# Patient Record
Sex: FEMALE | Race: WHITE
[De-identification: names, ages, dates, MRNs, and addresses within clinical notes are randomized per-mention and may not be internally consistent; named-entity substitution may affect disease eponyms.]

---

## 2017-08-21 ENCOUNTER — HOSPITAL ENCOUNTER (OUTPATIENT)
Dept: HOSPITAL 75 - RAD | Age: 32
End: 2017-08-21
Attending: NURSE PRACTITIONER
Payer: COMMERCIAL

## 2017-08-21 DIAGNOSIS — R10.2: ICD-10-CM

## 2017-08-21 DIAGNOSIS — N83.202: ICD-10-CM

## 2017-08-21 DIAGNOSIS — N83.201: Primary | ICD-10-CM

## 2017-08-21 PROCEDURE — 76830 TRANSVAGINAL US NON-OB: CPT

## 2017-08-21 NOTE — DIAGNOSTIC IMAGING REPORT
EXAMINATION: Pelvic ultrasound.



INDICATION: Pelvic pain.



COMPARISON: There are no prior studies available for comparison.



FINDINGS: The uterus is nongravid and not enlarged measuring 7.3

x 4.6 x 4.1 cm. The endometrial lining is slightly thickened

measuring 8 mm. This finding is nonspecific. Correlation with the

patient's menstrual cycle would be recommended. There is no focal

mass involving the uterus to suggest a fibroid.



Both ovaries were identified. There is a 1.4 x 1.0 x 1.1 cm cyst

associated with the right ovary. This cyst has a generally benign

appearance. There is also a 1.9 x 1.0 x 1.8 cm thick-walled cyst

arising from the left ovary. This cyst contains a few internal

echoes and may be slightly complicated by infection and/or

hemorrhage.



There is no solid pelvic mass or free fluid collection noted.



IMPRESSION:

1. There are bilateral ovarian cysts. The cyst on the left may be

slightly complicated by infection and/or hemorrhage. If further

study is desired, then a follow-up exam in 4-6 weeks would be

recommended.

2. There is no acute pelvic abnormality noted otherwise.



Dictated by: 



  Dictated on workstation # [id number]

## 2017-08-26 ENCOUNTER — HOSPITAL ENCOUNTER (EMERGENCY)
Dept: HOSPITAL 75 - ER | Age: 32
Discharge: HOME | End: 2017-08-26
Payer: COMMERCIAL

## 2017-08-26 VITALS — DIASTOLIC BLOOD PRESSURE: 59 MMHG | SYSTOLIC BLOOD PRESSURE: 130 MMHG

## 2017-08-26 VITALS — HEIGHT: 61 IN | BODY MASS INDEX: 22.28 KG/M2 | WEIGHT: 118 LBS

## 2017-08-26 DIAGNOSIS — J45.909: ICD-10-CM

## 2017-08-26 DIAGNOSIS — F32.9: ICD-10-CM

## 2017-08-26 DIAGNOSIS — R10.2: ICD-10-CM

## 2017-08-26 DIAGNOSIS — N83.202: Primary | ICD-10-CM

## 2017-08-26 DIAGNOSIS — Z98.51: ICD-10-CM

## 2017-08-26 LAB
ALBUMIN SERPL-MCNC: 4.1 GM/DL (ref 3.2–4.5)
ALT SERPL-CCNC: 17 U/L (ref 0–55)
ANION GAP SERPL CALC-SCNC: 9 MMOL/L (ref 5–14)
AST SERPL-CCNC: 18 U/L (ref 5–34)
BASOPHILS # BLD AUTO: 0.1 10^3/UL (ref 0–0.1)
BASOPHILS NFR BLD AUTO: 1 % (ref 0–10)
BILIRUB SERPL-MCNC: 0.3 MG/DL (ref 0.1–1)
BILIRUB UR QL STRIP: NEGATIVE
BUN SERPL-MCNC: 15 MG/DL (ref 7–18)
BUN/CREAT SERPL: 21
CALCIUM SERPL-MCNC: 8.6 MG/DL (ref 8.5–10.1)
CHLORIDE SERPL-SCNC: 109 MMOL/L (ref 98–107)
CO2 SERPL-SCNC: 21 MMOL/L (ref 21–32)
CREAT SERPL-MCNC: 0.73 MG/DL (ref 0.6–1.3)
EOSINOPHIL # BLD AUTO: 0.3 10^3/UL (ref 0–0.3)
EOSINOPHIL NFR BLD AUTO: 4 % (ref 0–10)
ERYTHROCYTE [DISTWIDTH] IN BLOOD BY AUTOMATED COUNT: 12.9 % (ref 10–14.5)
GFR SERPLBLD BASED ON 1.73 SQ M-ARVRAT: > 60 ML/MIN
GLUCOSE SERPL-MCNC: 92 MG/DL (ref 70–105)
KETONES UR QL STRIP: NEGATIVE
LEUKOCYTE ESTERASE UR QL STRIP: NEGATIVE
LYMPHOCYTES # BLD AUTO: 1.9 X 10^3 (ref 1–4)
LYMPHOCYTES NFR BLD AUTO: 29 % (ref 12–44)
MCH RBC QN AUTO: 29 PG (ref 25–34)
MCHC RBC AUTO-ENTMCNC: 34 G/DL (ref 32–36)
MCV RBC AUTO: 85 FL (ref 80–99)
MONOCYTES # BLD AUTO: 0.3 X 10^3 (ref 0–1)
MONOCYTES NFR BLD AUTO: 5 % (ref 0–12)
NEUTROPHILS # BLD AUTO: 4.1 X 10^3 (ref 1.8–7.8)
NEUTROPHILS NFR BLD AUTO: 61 % (ref 42–75)
NITRITE UR QL STRIP: NEGATIVE
PH UR STRIP: 6 [PH] (ref 5–9)
PLATELET # BLD: 175 10^3/UL (ref 130–400)
PMV BLD AUTO: 11.2 FL (ref 7.4–10.4)
POTASSIUM SERPL-SCNC: 3.9 MMOL/L (ref 3.6–5)
PROT SERPL-MCNC: 6.6 GM/DL (ref 6.4–8.2)
PROT UR QL STRIP: NEGATIVE
RBC # BLD AUTO: 4.38 10^6/UL (ref 4.35–5.85)
SODIUM SERPL-SCNC: 139 MMOL/L (ref 135–145)
SP GR UR STRIP: 1.01 (ref 1.02–1.02)
SQUAMOUS #/AREA URNS HPF: (no result) /HPF
UROBILINOGEN UR-MCNC: NORMAL MG/DL
WBC # BLD AUTO: 6.7 10^3/UL (ref 4.3–11)
WBC #/AREA URNS HPF: (no result) /HPF

## 2017-08-26 PROCEDURE — 85025 COMPLETE CBC W/AUTO DIFF WBC: CPT

## 2017-08-26 PROCEDURE — 36415 COLL VENOUS BLD VENIPUNCTURE: CPT

## 2017-08-26 PROCEDURE — 87591 N.GONORRHOEAE DNA AMP PROB: CPT

## 2017-08-26 PROCEDURE — 76856 US EXAM PELVIC COMPLETE: CPT

## 2017-08-26 PROCEDURE — 81000 URINALYSIS NONAUTO W/SCOPE: CPT

## 2017-08-26 PROCEDURE — 80053 COMPREHEN METABOLIC PANEL: CPT

## 2017-08-26 PROCEDURE — 87210 SMEAR WET MOUNT SALINE/INK: CPT

## 2017-08-26 PROCEDURE — 76830 TRANSVAGINAL US NON-OB: CPT

## 2017-08-26 PROCEDURE — 87491 CHLMYD TRACH DNA AMP PROBE: CPT

## 2017-08-26 PROCEDURE — 84702 CHORIONIC GONADOTROPIN TEST: CPT

## 2017-08-26 RX ADMIN — SODIUM CHLORIDE SCH MLS/HR: 900 INJECTION, SOLUTION INTRAVENOUS at 09:07

## 2017-08-26 RX ADMIN — KETOROLAC TROMETHAMINE ONE MG: 30 INJECTION, SOLUTION INTRAMUSCULAR; INTRAVENOUS at 09:07

## 2017-08-26 NOTE — ED GU-FEMALE
General


Chief Complaint:  Abdominal/GI Problems


Stated Complaint:  L SIDE BACK PAIN AND PELVIC PAIN


Nursing Triage Note:  


ADM TO ROOM REPORTS THAT SHE HAD LOW ABD FULLNESS  APX 2 WEEKS AGO SAW FAMILY 

DR 


WAS PLACED ON MEDS FOR YEAST INFECTION. LAST 2 DAYS ONSET O L FLANK PAIN PMH OF 


OVARIAN CYST.


Nursing Sepsis Screen:  No Definite Risk


Source:  patient, family


Exam Limitations:  no limitations





History of Present Illness


Time seen by provider:  08:55


Initial Comments


This 32-year-old  white female presents with left pelvic pain.  Patient 

was evaluated for a yeast infection and treated with Diflucan approximately a 

week ago.  Patient had on transvaginal ultrasound bilateral cysts greater on 

the left.





The patient's left pelvic pain has increased precipitating her presentation to 

the emergency department today.  The patient has had slight serosanguineous 

vaginal discharge.  The patient's pain is cramping in nature moderate in 

intensity and radiates to the left low back.





Patient has had a tubal ligation.  The patient denies associated fever, chills, 

dysuria, hematuria, nausea vomiting diarrhea or constipation.





Allergies and Home Medications


Allergies


Coded Allergies:  


     No Known Drug Allergies (Unverified , 14)





Home Medications


Docusate Sodium 100 Mg Cap, 100 MG PO BID, #60


   Prescribed by: BRANDO CASTILLO on 14 0904


Hydrocodone Bit/Acetaminophen 1 Tab Tab, 1-2 TAB PO Q4H PRN for pain, #45


   Prescribed by: BRANDO CASTILLO on 14 0904


Magnesium Oxide 400 Mg Tab, 400 MG PO BIDPC, #60


   Prescribed by: BRANDO CASTILLO on 14 0904


Prenatal Vit/Fe Fumarate/Fa 1 Each Tablet, 1 EACH PO DAILY, (Reported)


[Ibuprofen] 600 MG TAB, 600 MG PO Q6H PRN for PAIN, #40


   Prescribed by: BRANDO CASTILLO on 14 0904





Constitutional:  No chills, No fever


EENTM:  No hearing loss, No vision loss


Respiratory:  No cough


Cardiovascular:  No chest pain


Gastrointestinal:  No diarrhea, No vomiting


Genitourinary:  see HPI, discharge (small amount of serosanguineous vaginal 

discharge.), denies dysuria, denies frequency, denies hematuria, other (left 

pelvic pain radiating in the low back.)


Musculoskeletal:  back pain


Skin:  No change in color, No rash


Psychiatric/Neurological:  No Symptoms Reported


Endocrine:  No Symptoms Reported





Past Medical-Social-Family Hx


Patient Social History


Recent Foreign Travel:  No


Contact w/Someone Who Travel:  No


Recent Infectious Disease Expo:  No





Immunizations Up To Date


Tetanus Booster (TDap):  Less than 5yrs


PED Vaccines UTD:  No


Date of Influenza Vaccine:  Oct 1, 2012





Surgeries


History of Surgeries:  Yes (D&C x1)


Surgeries:  Tubal Ligation





Respiratory


History of Respiratory Disorde:  Yes (asthma with seasonal allergies)


Respiratory Disorders:  Asthma





Cardiovascular


History of Cardiac Disorders:  No





Neurological


History of Neurological Disord:  No





Reproductive System


Hx Reproductive Disorders:  No


Sexually Transmitted Disease:  No


HIV/AIDS:  No


Female Reproductive Disorders:  Denies





Gastrointestinal


History of Gastrointestinal Di:  No





Musculoskeletal


History of Musculoskeletal Dis:  Yes


Musculoskeletal Disorders:  Fibromyalgia





Endocrine


History of Endocrine Disorders:  No





Cancer


History of Cancer:  No





Psychosocial


History of Psychiatric Problem:  Yes (pp depression with last pregnancy)


Behavioral Health Disorders:  Depression





Integumentary


History of Skin or Integumenta:  No





Blood Transfusions


History of Blood Disorders:  No


Adverse Reaction to a Blood Tr:  No





Reviewed Nursing Assessment


Reviewed/Agree w Nursing PMH:  Yes





Family Medical History


Significant Family History:  Diabetes, Psychiatric Problems


Family Medial History:  


Family history: Diabetes mellitus


  19 MOTHER, Onset:40's - 50


Family history: Glaucoma


  19 MOTHER, Onset:50's - 60


Psychotic disorder


  19 MOTHER (depression)





Physical Exam


Vital Signs





Vital Sign - Last 12Hours








 17





 08:16


 


Temp 98.1


 


Pulse 69


 


Resp 18


 


B/P (MAP) 130/59


 


Pulse Ox 100


 


O2 Delivery Room Air





Capillary Refill : Less Than 3 Seconds


General Appearance:  WD/WN, no apparent distress


HEENT:  normal ENT inspection


Neck:  normal inspection


Cardiovascular:  regular rate, rhythm


Respiratory:  chest non-tender, lungs clear


Gastrointestinal:  normal bowel sounds, tenderness (greatest in the left lower 

quadrant.  No masses were noted.  There was questionable minimal rebound 

tenderness)


Back:  normal inspection, no CVA tenderness


Extremities:  normal range of motion, non-tender


Neurologic/Psychiatric:  no motor/sensory deficits, alert, normal mood/affect


Skin:  normal color, warm/dry





Progress/Results/Core Measures


Results/Orders


Lab Results





Laboratory Tests








Test


  17


08:59 17


09:10 Range/Units


 


 


White Blood Count


  6.7 


  


  4.3-11.0


10^3/uL


 


Red Blood Count


  4.38 


  


  4.35-5.85


10^6/uL


 


Hemoglobin 12.5   11.5-16.0  G/DL


 


Hematocrit 37   35-52  %


 


Mean Corpuscular Volume 85   80-99  FL


 


Mean Corpuscular Hemoglobin 29   25-34  PG


 


Mean Corpuscular Hemoglobin


Concent 34 


  


  32-36  G/DL


 


 


Red Cell Distribution Width 12.9   10.0-14.5  %


 


Platelet Count


  175 


  


  130-400


10^3/uL


 


Mean Platelet Volume 11.2 H  7.4-10.4  FL


 


Neutrophils (%) (Auto) 61   42-75  %


 


Lymphocytes (%) (Auto) 29   12-44  %


 


Monocytes (%) (Auto) 5   0-12  %


 


Eosinophils (%) (Auto) 4   0-10  %


 


Basophils (%) (Auto) 1   0-10  %


 


Neutrophils # (Auto) 4.1   1.8-7.8  X 10^3


 


Lymphocytes # (Auto) 1.9   1.0-4.0  X 10^3


 


Monocytes # (Auto) 0.3   0.0-1.0  X 10^3


 


Eosinophils # (Auto)


  0.3 


  


  0.0-0.3


10^3/uL


 


Basophils # (Auto)


  0.1 


  


  0.0-0.1


10^3/uL


 


Sodium Level 139   135-145  MMOL/L


 


Potassium Level 3.9   3.6-5.0  MMOL/L


 


Chloride Level 109 H    MMOL/L


 


Carbon Dioxide Level 21   21-32  MMOL/L


 


Anion Gap 9   5-14  MMOL/L


 


Blood Urea Nitrogen 15   7-18  MG/DL


 


Creatinine


  0.73 


  


  0.60-1.30


MG/DL


 


Estimat Glomerular Filtration


Rate > 60 


  


   


 


 


BUN/Creatinine Ratio 21    


 


Glucose Level 92     MG/DL


 


Calcium Level 8.6   8.5-10.1  MG/DL


 


Total Bilirubin 0.3   0.1-1.0  MG/DL


 


Aspartate Amino Transf


(AST/SGOT) 18 


  


  5-34  U/L


 


 


Alanine Aminotransferase


(ALT/SGPT) 17 


  


  0-55  U/L


 


 


Alkaline Phosphatase 54     U/L


 


Total Protein 6.6   6.4-8.2  GM/DL


 


Albumin 4.1   3.2-4.5  GM/DL


 


Human Chorionic Gonadotropin,


Quant < 5 


  


  <5  MIU/ML


 


 


Urine Color  YELLOW   


 


Urine Clarity  CLEAR   


 


Urine pH  6  5-9  


 


Urine Specific Gravity  1.015 L 1.016-1.022  


 


Urine Protein  NEGATIVE  NEGATIVE  


 


Urine Glucose (UA)  NEGATIVE  NEGATIVE  


 


Urine Ketones  NEGATIVE  NEGATIVE  


 


Urine Nitrite  NEGATIVE  NEGATIVE  


 


Urine Bilirubin  NEGATIVE  NEGATIVE  


 


Urine Urobilinogen  NORMAL  NORMAL  MG/DL


 


Urine Leukocyte Esterase  NEGATIVE  NEGATIVE  


 


Urine RBC (Auto)  2+ H NEGATIVE  


 


Urine RBC  2-5 H  /HPF


 


Urine WBC  NONE   /HPF


 


Urine Squamous Epithelial


Cells 


  5-10 


   /HPF


 


 


Urine Crystals  NONE   /LPF


 


Urine Bacteria  NEGATIVE   /HPF


 


Urine Casts  NONE   /LPF


 


Urine Mucus  NEGATIVE   /LPF


 


Urine Culture Indicated  NO   








My Orders





Orders - SAULO CENTENO MD


Hcg,Quantitative (17 08:51)


Cbc With Automated Diff (17 08:51)


Comprehensive Metabolic Panel (17 08:51)


Ua Culture If Indicated (17 08:51)


Us Non Ob Pelvis Comp/Transvag (17 08:51)


Ketorolac Injection (Toradol Injection) (17 09:00)


Ns Iv 1000 Ml (Sodium Chloride 0.9%) (17 09:00)





Medications Given in ED





Current Medications








 Medications  Dose


 Ordered  Sig/Rhonda


 Route  Start Time


 Stop Time Status Last Admin


Dose Admin


 


 Ketorolac


 Tromethamine  30 mg  ONCE  ONCE


 IVP  17 09:00


 17 09:01 DC 17 09:07


30 MG








Vital Signs/I&O





Vital Sign - Last 12Hours








 17





 08:16


 


Temp 98.1


 


Pulse 69


 


Resp 18


 


B/P (MAP) 130/59


 


Pulse Ox 100


 


O2 Delivery Room Air














Blood Pressure Mean:  82








Progress Note :  


   Time:  10:40


Progress Note


The patient's laboratory evaluation and vaginal ultrasound demonstrated 

involuted left ovarian cyst.





Patient's pain was significantly improved with IV Toradol.





Pelvic exam was unremarkable.  There was some moderate left adnexal tenderness 

but no masses were appreciated.





Departure


Impression


Impression:  


 Primary Impression:  


 Pelvic pain


Disposition:   HOME, SELF-CARE


Condition:  Improved





Departure-Patient Inst.


Decision time for Depature:  10:42


Referrals:  


MANDY DWYER MD (PCP/Family)


Primary Care Physician


Patient Instructions:  Ovarian Cyst (DC)





Add. Discharge Instructions:  


Oral Toradol for residual pain as needed.  Close follow-up with your caregiver 

of choice on Monday.  Return if any problems or questions.  All discharge 

instructions reviewed with patient and/or family. Voiced understanding.











SAULO CENTENO MD Aug 26, 2017 09:00

## 2017-08-28 NOTE — XMS REPORT
MU2 Ambulatory Summary

 Created on: 2015



Lorin Lopez

External Reference #: [id number]

: 1985

Sex: Female



Demographics







 Address  [address] [place]  [address]

 

 Home Phone  [phone]

 

 Preferred Language  English

 

 Marital Status  Never 

 

 Druze Affiliation  Unknown

 

 Race  White

 

 Ethnic Group  Not  or 





Author







 Author  [facility]

 

 Organization  [facility]

 

 Address  [address]

[place], [place]  [address]



 

 Phone  [phone]







Care Team Providers







 Care Team Member Name  Role  Phone

 

  PCP  Unavailable







Allergies and Adverse Reactions







 Name  Reaction  Notes

 

   NO KNOWN DRUG ALLERGIES      







Plan of Treatment

Not available.



Medications







 Active 

 

 Name  Start Date  Estimated Completion Date  SIG  Comments

 

 Paxil Oral tablet 10 mg        take 1 tablet (10 mg) by oral route once daily 
  

 

 Prenatal Vitamin Oral tablet 27-0.8 mg        take 1 tablet by oral route once 
daily   

 

 albuterol sulfate Inhalation Solution for Nebulization 2.5 mg/0.5 mL        
inhale 0.5 milliliter (2.5 mg) by nebulization route 3 times per day as needed 
  

 

 amoxicillin oral tablet 500 mg  2015  one TID   







Problem List







 Description  Status  Onset

 

 Asthma  Active   

 

 Depression  Active   

 

 Fatigue  Active  2015

 

 Joint pain  Active  2015

 

 Depressive Disorder  Active  2015







Vital Signs







 Date  Time  BP-Sys(mm[Hg]  BP-Poly(mm[Hg])  HR(bpm)  RR(rpm)  Temp  WT  HT  HC  
BMI  BSA  BMI Percentile  O2 Sat(%)

 

 2015  2:19:00 PM  105 mmHg  60 mmHg  78 bpm  18 rpm  97.5 F  107 lbs  62 
in     19.57 kg/m2  1.46 m2     98 %

 

 3/1/2013  3:12:00 PM  120 mmHg  60 mmHg  98 bpm  18 rpm  97 F  115.25 lbs  63 
in     20.4154 kg/m  1.5244 m     100 %







Social History







 Name  Description  Comments

 

 denies alcohol use      

 

       

 

 College graduate      

 

 Tobacco  Never smoker   

 

 Exercises regularly      

 

 Uses seatbelts      







History of Procedures







 Date Ordered  Description  Order Status

 

 3/1/2013 12:00 AM  THER/PROPH/DIAG INJ SC/IM  Reviewed







Results Summary

Not available.



History Of Immunizations

Not available.



History of Past Illness







 Name  Date of Onset  Comments

 

 Depression      

 

 Asthma      

 

 Fatigue  2015   

 

 Joint pain  2015   

 

 Depressive Disorder  2015   

 

 Cough  Mar  1 2013  3:13PM   

 

 Post-nasal drainage  Mar  1 2013  3:13PM   

 

 Upper Respiratory Infection  Mar  1 2013  3:13PM   

 

 Depressive Disorder  2015  2:19PM   

 

 Fatigue  2015  2:19PM   

 

 Joint pain  2015  2:19PM   







Payers







 Insurance Name  Company Name  Plan Name  Plan Number  Policy Number  Policy 
Group Number  Start Date

 

    [facility]     [id number]     N/A







History of Encounters







 Visit Date  Visit Type  Provider

 

 2015  Office visit  ALL CRUZ

 

 3/1/2013  Office visit  ALL CRUZ

## 2018-08-17 ENCOUNTER — HOSPITAL ENCOUNTER (OUTPATIENT)
Dept: HOSPITAL 75 - RAD | Age: 33
End: 2018-08-17
Attending: FAMILY MEDICINE
Payer: COMMERCIAL

## 2018-08-17 DIAGNOSIS — K21.9: Primary | ICD-10-CM

## 2018-08-17 DIAGNOSIS — R94.5: ICD-10-CM

## 2018-08-17 PROCEDURE — 76705 ECHO EXAM OF ABDOMEN: CPT

## 2018-08-17 NOTE — DIAGNOSTIC IMAGING REPORT
PROCEDURE: US Gallbladder.



TECHNIQUE: Multiple real-time grayscale images were obtained over

the right upper quadrant in various projections.



INDICATION:  Postprandial nausea, elevated liver function

studies.



FINDINGS: Gallbladder is normal. No stone or sludge. The

gallbladder nondilated and wall non-thickened. Hepatic

echotexture pattern appeared normal. There is no liver mass.

There is no intra-or extrahepatic bile duct dilatation evident,

however the majority of the extrahepatic duct as well as the

pancreas obscured by shadowing bowel gas. The unobstructed right

kidney normal in size, cortical thickness and echotexture. There

is no ascites.



IMPRESSION:



Normal right upper quadrant ultrasound.



Dictated by: 



  Dictated on workstation # [id number]

## 2019-01-07 ENCOUNTER — HOSPITAL ENCOUNTER (OUTPATIENT)
Dept: HOSPITAL 75 - PREOP | Age: 34
Discharge: HOME | End: 2019-01-07
Attending: OBSTETRICS & GYNECOLOGY
Payer: COMMERCIAL

## 2019-01-07 VITALS — BODY MASS INDEX: 22.28 KG/M2 | WEIGHT: 118 LBS | HEIGHT: 61 IN

## 2019-01-07 DIAGNOSIS — Z01.818: Primary | ICD-10-CM

## 2019-01-08 ENCOUNTER — HOSPITAL ENCOUNTER (OUTPATIENT)
Dept: HOSPITAL 75 - SDC | Age: 34
Discharge: HOME | End: 2019-01-08
Attending: OBSTETRICS & GYNECOLOGY
Payer: COMMERCIAL

## 2019-01-08 VITALS — WEIGHT: 118 LBS | HEIGHT: 61 IN | BODY MASS INDEX: 22.28 KG/M2

## 2019-01-08 VITALS — SYSTOLIC BLOOD PRESSURE: 115 MMHG | DIASTOLIC BLOOD PRESSURE: 72 MMHG

## 2019-01-08 VITALS — SYSTOLIC BLOOD PRESSURE: 102 MMHG | DIASTOLIC BLOOD PRESSURE: 70 MMHG

## 2019-01-08 VITALS — DIASTOLIC BLOOD PRESSURE: 59 MMHG | SYSTOLIC BLOOD PRESSURE: 110 MMHG

## 2019-01-08 VITALS — DIASTOLIC BLOOD PRESSURE: 73 MMHG | SYSTOLIC BLOOD PRESSURE: 118 MMHG

## 2019-01-08 DIAGNOSIS — J45.909: ICD-10-CM

## 2019-01-08 DIAGNOSIS — N92.0: Primary | ICD-10-CM

## 2019-01-08 DIAGNOSIS — M79.7: ICD-10-CM

## 2019-01-08 PROCEDURE — 84703 CHORIONIC GONADOTROPIN ASSAY: CPT

## 2019-01-08 PROCEDURE — 87081 CULTURE SCREEN ONLY: CPT

## 2019-01-08 PROCEDURE — 94664 DEMO&/EVAL PT USE INHALER: CPT

## 2019-01-08 NOTE — XMS REPORT
Continuity of Care Document

 Created on: 2019



Lorin Lopez

External Reference #: [id number]

: 1985

Sex: Female



Demographics







 Address  [address] [place] [address]  [address]

 

 Home Phone  [phone] x

 

 Preferred Language  Unknown

 

 Marital Status  Unknown

 

 Rastafarian Affiliation  Unknown

 

 Race  Unknown

 

 Ethnic Group  Unknown





Author







 Author  [facility]

 

 Organization  [facility]

 

 Address  Unknown

 

 Phone  Unavailable



              



Allergies

      





 Active            Description            Code            Type            
Severity            Reaction            Onset            Reported/Identified   
         Relationship to Patient            Clinical Status        

 

 Yes            No Known Drug Allergies            [id number]            Drug 
Allergy            Unknown            N/A                         2014   
                               



                  



Medications

      



There is no data.                  



Problems

      





 Date Dx Coded            Attending            Type            Code            
Diagnosis            Diagnosed By        

 

 2013                         Ot            632            MISSED 
                     

 

 2014            CHRISTIAN SEBASTIAN, MEGHA TIMMONS            Ot            
648.93            OT CURR COND-ANTEPARTUM                     

 

 2014            CHRISTIAN SEBASTIAN, MEGHA TIMMONS            Ot            
716.90            ARTHROPATHY NOS-UNSPEC                     

 

 2014            ALL ROLDAN DO            Ot            648.93    
        OT CURR COND-ANTEPARTUM                     

 

 2014            ALL ROLDAN DO            Ot            780.2     
       SYNCOPE AND COLLAPSE                     

 

 2014            LYDIA SEBASTIAN, KESHAV                         V65.11       
     NEW MOMMY VISIT                     

 

 2014            BRANDO CASTILLO DO            Ot            285.1        
    AC POSTHEMORRHAG ANEMIA                     

 

 2014            BRANDO CASTILLO DO            Ot            285.9        
    ANEMIA NOS                     

 

 2014            BRANDO CASTILLO DO            Ot            648.21       
     ANEMIA-DELIVERED                     

 

 2014            BRANDO CASTILLO DO            Ot            654.21       
     PREV  DELIVRY W/ OR W/O MENT ANT                     

 

 2014            BRANDO CASTILLO DO            Ot            V27.0        
    DELIVER-SINGLE LIVEBORN                     

 

 2017            BRANDO CASTILLO DO            Ot            V28.81       
     ENCOUNTER FOR FETAL ANATOMIC SURVEY                     

 

 2017            BRANDO CASTILLO DO            Ot            V28.81       
     ENCOUNTER FOR FETAL ANATOMIC SURVEY                     

 

 2017            ALONDRA EDWARD APRN            Ot            N83.201   
         UNSPECIFIED OVARIAN CYST, RIGHT SIDE                     

 

 2017            ALONDRA EDWARD APRN            Ot            N83.202   
         UNSPECIFIED OVARIAN CYST, LEFT SIDE                     

 

 2017            ALONDRA EDWARD APRN            Ot            R10.2     
       PELVIC AND PERINEAL PAIN                     

 

 2017            JACOBO SEBASTIAN, SAULO MCDANIEL            Ot            F32.9       
     MAJOR DEPRESSIVE DISORDER, SINGLE EPISOD                     

 

 2017            JACOBO SEBASTIAN, SAULO MCDANIEL            Ot            J45.909     
       UNSPECIFIED ASTHMA, UNCOMPLICATED                     

 

 2017            JACOBO SEBASTIAN, SAULO MCDANIEL            Ot            N83.202     
       UNSPECIFIED OVARIAN CYST, LEFT SIDE                     

 

 2017            JACOBO SEBASTIAN, SAULO MCDANIEL            Ot            R10.2       
     PELVIC AND PERINEAL PAIN                     

 

 2017            JACOBO SEBASTIAN, SAULO MCDANIEL            Ot            Z98.51      
      TUBAL LIGATION STATUS                     

 

 2017            JACOBO SEBASTIAN, SAULO MCDANIEL            Ot            F32.9       
     MAJOR DEPRESSIVE DISORDER, SINGLE EPISOD                     

 

 2017            JACOBO SEBASTIAN, SAULO MCDANIEL            Ot            J45.909     
       UNSPECIFIED ASTHMA, UNCOMPLICATED                     

 

 2017            JACOBO SEBASTIAN, SAULO MCDANIEL            Ot            N83.202     
       UNSPECIFIED OVARIAN CYST, LEFT SIDE                     

 

 2017            JACOBO SEBASTIAN, SAULO MCDANIEL            Ot            R10.2       
     PELVIC AND PERINEAL PAIN                     

 

 2017            JACOBO SEBASTIAN, SAULO MCDANIEL            Ot            Z98.51      
      TUBAL LIGATION STATUS                     

 

 2017            JACOBO SEBASTIAN, SAULO MCDANIEL            Ot            F32.9       
     MAJOR DEPRESSIVE DISORDER, SINGLE EPISOD                     

 

 2017            JACOBO SEBASTIAN, SAULO MCDANIEL            Ot            J45.909     
       UNSPECIFIED ASTHMA, UNCOMPLICATED                     

 

 2017            JACOBO SEBASTIAN, SAULO MCDANIEL            Ot            N83.202     
       UNSPECIFIED OVARIAN CYST, LEFT SIDE                     

 

 2017            JACOBO SEBASTIAN, SAULO MCDANIEL            Ot            R10.2       
     PELVIC AND PERINEAL PAIN                     

 

 2017            JACOBO SEBASTIAN, SAULO MCDANIEL            Ot            Z98.51      
      TUBAL LIGATION STATUS                     

 

 2017            ALONDRA EDWARD            Ot            N83.201   
         UNSPECIFIED OVARIAN CYST, RIGHT SIDE                     

 

 2017            ALONDRA EDWARD            Ot            N83.202   
         UNSPECIFIED OVARIAN CYST, LEFT SIDE                     

 

 2017            ALONDRA EDWARD            Ot            R10.2     
       PELVIC AND PERINEAL PAIN                     

 

 2018                         Ot            K21.9            GASTRO-
ESOPHAGEAL REFLUX DISEASE WITHOUT                     

 

 2018                         Ot            R94.5            ABNORMAL 
RESULTS OF LIVER FUNCTION STUDI                     

 

 2018                         Ot            K21.9            GASTRO-
ESOPHAGEAL REFLUX DISEASE WITHOUT                     

 

 2018                         Ot            R94.5            ABNORMAL 
RESULTS OF LIVER FUNCTION STUDI                     

 

 2019            BRANDO CASTILLO DO            Ot            Z01.818      
      ENCOUNTER FOR OTHER PREPROCEDURAL EXAMIN                     



                                                                               
               



Procedures

      





 Code            Description            Performed By            Performed On   
     

 

             74.1                                  LOW CERVICAL       
                             07/15/2014        



                  



Results

      





 Test            Result            Range        









 Vitamin D, 25 OH - 17 10:35         









 Vitamin D, 25 OH            26.50 ng/mL            25..00        









 Lab Card - 17 10:35         









 LabCard            Specimen submitted to [facility] for Testing.        
              









 Hep B Surface Ab - 17 14:40         









 HEP B SURFACE AB, QUAL            REACTIVE                      









 CBC with Auto Diff - 17 11:16         









 Baso%            0.30 %            0.00-2.50        

 

 Eos            0.1 K/uL            0.0-0.7        

 

 Eos%            1.7 %            0.0-7.0        

 

 Hct            39.0 %            36.0-46.0        

 

 Hgb            12.9 g/dL            13.0-15.0        

 

 Lym            1.97 K/uL            0.60-3.40        

 

 Lym%            28.4 %            10.0-50.0        

 

 MCH            28.9 pg            27.0-31.0        

 

 MCHC            33.1 g/dL            32.0-36.0        

 

 MCV            87.4 fL            80.0-97.0        

 

 Mono%            6.5 %            0.0-12.0        

 

 MPV            10.6 fL            7.4-10.0        

 

 Radu%            63.1 %            37.0-80.0        

 

 Plt            223 K/uL            150-400        

 

 RBC            4.46 M/uL            3.60-5.00        

 

 RDW            13.6 %            11.6-14.8        

 

 WBC            6.93 K/uL            5.00-10.00        

 

 Radu            4.37 K/uL            2.00-6.90        

 

 Mono            0.5 K/uL            0.0-0.9        

 

 Baso            0.0 K/uL            0.0-0.2        









 Complete blood count (CBC) with automated white blood cell (WBC) differential 
- 17 08:59         









 Blood leukocytes automated count (number/volume)            6.7 10*3/uL       
     4.3-11.0        

 

 Blood erythrocytes automated count (number/volume)            4.38 10*6/uL    
        4.35-5.85        

 

 Venous blood hemoglobin measurement (mass/volume)            12.5 g/dL        
    11.5-16.0        

 

 Blood hematocrit (volume fraction)            37 %            35-52        

 

 Automated erythrocyte mean corpuscular volume            85 [foz_us]          
  80-99        

 

 Automated erythrocyte mean corpuscular hemoglobin (mass per erythrocyte)      
      29 pg            25-34        

 

 Automated erythrocyte mean corpuscular hemoglobin concentration measurement (
mass/volume)            34 g/dL            32-36        

 

 Automated erythrocyte distribution width ratio            12.9 %            
10.0-14.5        

 

 Automated blood platelet count (count/volume)            175 10*3/uL          
  130-400        

 

 Automated blood platelet mean volume measurement            11.2 [foz_us]     
       7.4-10.4        

 

 Automated blood neutrophils/100 leukocytes            61 %            42-75   
     

 

 Automated blood lymphocytes/100 leukocytes            29 %            12-44   
     

 

 Blood monocytes/100 leukocytes            5 %            0-12        

 

 Automated blood eosinophils/100 leukocytes            4 %            0-10     
   

 

 Automated blood basophils/100 leukocytes            1 %            0-10        

 

 Blood neutrophils automated count (number/volume)            4.1 10*3         
   1.8-7.8        

 

 Blood lymphocytes automated count (number/volume)            1.9 10*3         
   1.0-4.0        

 

 Blood monocytes automated count (number/volume)            0.3 10*3            
0.0-1.0        

 

 Automated eosinophil count            0.3 10*3/uL            0.0-0.3        

 

 Automated blood basophil count (count/volume)            0.1 10*3/uL          
  0.0-0.1        









 Comprehensive metabolic panel - 17 08:59         









 Serum or plasma sodium measurement (moles/volume)            139 mmol/L       
     135-145        

 

 Serum or plasma potassium measurement (moles/volume)            3.9 mmol/L    
        3.6-5.0        

 

 Serum or plasma chloride measurement (moles/volume)            109 mmol/L     
               

 

 Carbon dioxide            21 mmol/L            21-32        

 

 Serum or plasma anion gap determination (moles/volume)            9 mmol/L    
        5-14        

 

 Serum or plasma urea nitrogen measurement (mass/volume)            15 mg/dL   
         7-18        

 

 Serum or plasma creatinine measurement (mass/volume)            0.73 mg/dL    
        0.60-1.30        

 

 Serum or plasma urea nitrogen/creatinine mass ratio            21             
NRG        

 

 Serum or plasma creatinine measurement with calculation of estimated 
glomerular filtration rate            >             NRG        

 

 Serum or plasma glucose measurement (mass/volume)            92 mg/dL         
           

 

 Serum or plasma calcium measurement (mass/volume)            8.6 mg/dL        
    8.5-10.1        

 

 Serum or plasma total bilirubin measurement (mass/volume)            0.3 mg/dL
            0.1-1.0        

 

 Serum or plasma alkaline phosphatase measurement (enzymatic activity/volume)  
          54 U/L                    

 

 Serum or plasma aspartate aminotransferase measurement (enzymatic activity/
volume)            18 U/L            5-34        

 

 Serum or plasma alanine aminotransferase measurement (enzymatic activity/volume
)            17 U/L            0-55        

 

 Serum or plasma protein measurement (mass/volume)            6.6 g/dL         
   6.4-8.2        

 

 Serum or plasma albumin measurement (mass/volume)            4.1 g/dL         
   3.2-4.5        









 Serum or plasma choriogonadotropin measurement (units/volume) - 17 08:59
         









 Serum or plasma choriogonadotropin measurement (units/volume)            < m[iU
]/mL            <5        









 Complete urinalysis with reflex to culture - 17 09:10         









 Urine color determination            YELLOW             NRG        

 

 Urine clarity determination            CLEAR             NRG        

 

 Urine pH measurement by test strip            6             5-9        

 

 Specific gravity of urine by test strip            1.015             1.016-
1.022        

 

 Urine protein assay by test strip, semi-quantitative            NEGATIVE      
       NEGATIVE        

 

 Urine glucose detection by automated test strip            NEGATIVE           
  NEGATIVE        

 

 Erythrocytes detection in urine sediment by light microscopy            2+    
         NEGATIVE        

 

 Urine ketones detection by automated test strip            NEGATIVE           
  NEGATIVE        

 

 Urine nitrite detection by test strip            NEGATIVE             NEGATIVE
        

 

 Urine total bilirubin detection by test strip            NEGATIVE             
NEGATIVE        

 

 Urine urobilinogen measurement by automated test strip (mass/volume)          
  NORMAL             NORMAL        

 

 Urine leukocyte esterase detection by dipstick            NEGATIVE             
NEGATIVE        

 

 Automated urine sediment erythrocyte count by microscopy (number/high power 
field)             [HPF]            NRG        

 

 Automated urine sediment leukocyte count by microscopy (number/high power field
)            NONE             NRG        

 

 Bacteria detection in urine sediment by light microscopy            NEGATIVE  
           NRG        

 

 Squamous epithelial cells detection in urine sediment by light microscopy     
       5-10             NRG        

 

 Crystals detection in urine sediment by light microscopy            NONE      
       NRG        

 

 Casts detection in urine sediment by light microscopy            NONE         
    NRG        

 

 Mucus detection in urine sediment by light microscopy            NEGATIVE     
        NRG        

 

 Complete urinalysis with reflex to culture            NO             NRG      
  









 Microscopic examination by wet preparation - 17 10:38         









 WET PREP RESULTS            NO TRICHOMONAS OBSERVED             NRG        









 Chlamydia trachomatis DNA detection by probe and signal amplification method - 
17 10:38         









 Chlamydia trachomatis DNA detection by probe and target amplification method  
          Not Detected             Not Detected        









 Neisseria gonorrhoeae DNA detection by probe and signal amplification method - 
17 10:38         









 Gonorrhea amp DNA-urine            Not Detected             Not Detected      
  









 Lab Card - 18 08:29         









 LabCard            Specimen submitted to [facility] for Testing.        
              









 Lab Card - 18 09:53         









 LabCard            Specimen submitted to [facility] for Testing.        
              



                                        



Encounters

      





 ACCT No.            Visit Date/Time            Discharge            Status    
        Pt. Type            Provider            Facility            Loc./Unit  
          Complaint        

 

 [id number]            08/10/2015 21:51:47            08/10/2015 23:59:59          
  [facility]            Outpatient            [facility]                       
                        

 

 [id number]            2019 05:33:00            2019 23:59:59    
        CLS            Outpatient            BRANDO CASTILLO DO            [facility] 
[facility]            PREOP            MENORRHAGIA        

 

 [id number]            2017 08:08:00            2017 11:04:00    
        DIS            Emergency            JACOBO SEBASTIAN, SAULO MCDANIEL            [facility] 
[facility]            ER            L SIDE BACK PAIN AND 
PELVIC PAIN        

 

 [id number]            2017 13:57:00            2017 23:59:59    
        CLS            Outpatient            ALONDRA EDWARD            
[facility]            RAD            PELVIC AND PERINEAL 
PAIN        

 

 [id number]            07/15/2014 00:01:00            2014 17:50:00    
        DIS            Inpatient            BRANDO CASTILLO DO            [facility] 
[facility]            LDRP            PT [facility] [place]     
   

 

 [id number]            2014 17:50:00            2014 20:25:00    
        DIS            Outpatient            ALL ROLDAN DO            
[facility]            WSo            SYNCOPE        

 

 [id number]            2014 09:21:00            2014 11:35:00    
        DIS            Outpatient            MEGHA GONZALES MD         
   [facility] [address]            JOINT PAIN        

 

 [id number]            2014 09:45:00            2014 23:59:59    
        CLS            Outpatient            BRANDO CASTILLO DO            [facility] 
[facility]            RAD            FETAL SURVEY        

 

 [id number]            2019 08:00:00                         PEN       
     Preadmit            BRANDO CASTILLO DO            [facility]            SDC            MENORRHAGIA        

 

 [id number]            2018 06:58:00                                   
   Document Registration                                                       
     

 

 [id number]            2013 09:48:00                                   
   Document Registration                                                       
     

 

 [id number]            2018 09:43:00            2018 23:59:00          
  DIS            Outpatient            MANDY DWYER                         
                      

 

 [id number]            2018 08:11:00            2018 23:59:00          
  DIS            Outpatient            MARGARITAEMERALD MANDY                         
                      

 

 [id number]            10/03/2017 16:24:00            10/03/2017 23:59:00          
  DIS            Outpatient            SELF, PHY                               
                

 

 [id number]            2017 11:13:00            2017 23:59:00          
  DIS            Outpatient            REYMUNDO MANDY                         
                      

 

 [id number]            2017 10:30:00            2017 23:59:00          
  DIS            Outpatient            MATY DICKERSON                          
                     

 

 [id number]            10/17/2016 15:40:00            10/17/2016 23:59:00          
  DIS            Outpatient            SELF, GILLIAN                               
                

 

 [id number]            2017 14:36:00                                      
Document Registration                                                          
  

 

 [id number]            2014 14:38:00            2014 23:59:59          
  [facility]            Outpatient            LYDIA SEBASTIAN, KESHAV

## 2019-01-08 NOTE — PROGRESS NOTE-PRE OPERATIVE
Pre-Operative Progress Note


H&P Reviewed


The H&P was reviewed, patient examined and no changes noted.


Date Seen by Provider:  Jan 8, 2019


Time Seen by Provider:  10:30


Date H&P Reviewed:  Jan 8, 2019


Time H&P Reviewed:  07:30


Pre-Operative Diagnosis:  menorrhagia











BRANDO CASTILLO DO Jan 8, 2019 10:32

## 2019-01-08 NOTE — OPERATIVE REPORT
Operative Report


Date of Procedure/Surgery


Jan 8, 2019


Surgeon (s)


BRANDO CASTILLO DO


Assistant (s):  NA





Post-Operative Diagnosis





menorrhagia





Procedure Performed





hysteroscopy dilation and curettage, Novasure endometrial ablation





Description of Procedure


Anesthesia Type:  General


Estimated blood loss (mL):  minimal


Specimen(s) collected/removed


endometrial curettings


Description of the Procedure


With informed consent the patient was taken to the operating room where general 

anesthesia was found to be adequate.  She was prepped and draped in the usual 

sterile fashion in the dorsolithotomy position.  The bladder was drained of 

clear yellow urine with a straight cath.  A weighted speculum was placed in the 

vagina and the cervix was grasped with a tenaculum.  The cervix was dilated 

with Giuseppe dilators. I then inserted  a sure Sound.  The length was 6.5 cm


At this point The hysteroscope was inserted and a hysteroscope was done.  There 

was no abnormal pathology.  


I removed the scope and a gentle curette was done with a small amount of 

proliferative tissue that was sent for pathology.





I then inserted the NovaSure device and measurements were taken.  The width was 

3.8 cm.  I then did the compliance test by inserting a small puff of CO2 and 

once this was passed, the device was enabled.  Ablation was achieved at 60 

seconds with a power of 136.  The device halted the procedure once the ablation 

was completed.  The patient tolerated the procedure well.  The device and 

instruments were removed.  the patient was awakened and taken to recovery in a 

stable condition.  Sponge, lap and instrument counts were correct times two.


Findings of the Procedure


Length 6.5 cm


width 3.8 cm


Poser 136


60 seconds


40 ml deficit





Allergies and Home Medications


Allergies


Coded Allergies:  


     No Known Drug Allergies (Unverified , 1/7/19)





Home Medications


Fluoxetine HCl 40 Mg Capsule, 40 MG PO DAILY, (Reported)


Multivitamin 1 Each Tablet, 1 EACH PO DAILY, (Reported)


Norgestrel-Ethinyl Estradiol 1 Each Tablet, 1 EACH PO DAILY, (Reported)


Ranitidine HCl 75 Mg Tablet, 75 MG PO DAILY, (Reported)





Patient Home Medication List


Home Medication List Reviewed:  Yes











BRANDO CASTILLO DO Jan 8, 2019 11:13

## 2019-01-08 NOTE — DISCHARGE INST-WOMEN'S SERVICE
Discharge Inst-Women's Serv


Depart Medication/Instructions


New, Converted or Re-Newed RX:  RX on Chart


Instructions


expect light spotting, bleeding for up to 7 days


vaginal discharge at about 7-10 days post op is normal


call for fever, pain, concerns


Final Diagnosis


menorrhagia





Consults/Follow Up


Additional Follow Up:  Yes (2 weeks with Carolee or Brock)





Activity


Activity:  Activity as Tolerated


Driving Instructions:  No Driving for 24 Hours


NO SMOKING:  NO SMOKING


Nothing Inside Vagina:  No Douching, No [place] (for 2 weeks), No Tampons





Diet


Discharge Diet:  No Restrictions


Symptoms to Report to DrAndre:  Bleeding Excessive, Pain Increased, Fever Over 101 

Degrees F, Vaginal Bleeding Increase, Cramps in Feet or Legs, Vaginal Discharge 

Foul


For Any Problems or Questions:  Contact Your Physician











BRANDO CASTILLO DO Jan 8, 2019 11:19

## 2021-10-21 ENCOUNTER — HOSPITAL ENCOUNTER (OUTPATIENT)
Dept: HOSPITAL 75 - RAD | Age: 36
End: 2021-10-21
Attending: OBSTETRICS & GYNECOLOGY
Payer: COMMERCIAL

## 2021-10-21 DIAGNOSIS — R10.2: Primary | ICD-10-CM

## 2021-10-21 PROCEDURE — 76830 TRANSVAGINAL US NON-OB: CPT

## 2021-10-21 PROCEDURE — 76856 US EXAM PELVIC COMPLETE: CPT

## 2021-10-21 NOTE — DIAGNOSTIC IMAGING REPORT
PROCEDURE: 

Pelvic comp/transvaginal sonogram.



TECHNIQUE: 

Complete transabdominal and transvaginal pelvic ultrasound was

performed. In addition, limited pelvic Doppler was performed.



INDICATION:  Dyspareunia and pelvic pain.



Uterus measures 6.4 x 3.1 x 4.1 cm. Endometrium is 4 mm in

thickness and shows some heterogeneity. There is heterogeneity of

the myometrium but no discrete myometrial mass is detected. Right

ovary measures 1.5 x 2.1 x 2.3 cm and left ovary measures 2.1 x

1.4 x 1.4 cm. Both ovaries demonstrate blood flow. There is no

free fluid.



IMPRESSION: There is endometrial and myometrial heterogeneity. No

discrete mass is detected. The study is otherwise unremarkable.







Dictated by: 



  Dictated on workstation # [id number]

## 2021-11-02 ENCOUNTER — HOSPITAL ENCOUNTER (OUTPATIENT)
Dept: HOSPITAL 75 - PREOP | Age: 36
LOS: 1 days | Discharge: HOME | End: 2021-11-03
Attending: OBSTETRICS & GYNECOLOGY
Payer: COMMERCIAL

## 2021-11-02 VITALS — BODY MASS INDEX: 20.39 KG/M2 | HEIGHT: 62.01 IN | WEIGHT: 112.22 LBS

## 2021-11-02 DIAGNOSIS — Z01.818: Primary | ICD-10-CM

## 2021-11-09 ENCOUNTER — HOSPITAL ENCOUNTER (OUTPATIENT)
Dept: HOSPITAL 75 - SDC | Age: 36
Discharge: HOME | End: 2021-11-09
Attending: OBSTETRICS & GYNECOLOGY
Payer: COMMERCIAL

## 2021-11-09 VITALS — DIASTOLIC BLOOD PRESSURE: 56 MMHG | SYSTOLIC BLOOD PRESSURE: 82 MMHG

## 2021-11-09 VITALS — SYSTOLIC BLOOD PRESSURE: 90 MMHG | DIASTOLIC BLOOD PRESSURE: 52 MMHG

## 2021-11-09 VITALS — WEIGHT: 112.22 LBS | HEIGHT: 62.01 IN | BODY MASS INDEX: 20.39 KG/M2

## 2021-11-09 VITALS — DIASTOLIC BLOOD PRESSURE: 55 MMHG | SYSTOLIC BLOOD PRESSURE: 88 MMHG

## 2021-11-09 VITALS — DIASTOLIC BLOOD PRESSURE: 40 MMHG | SYSTOLIC BLOOD PRESSURE: 86 MMHG

## 2021-11-09 VITALS — SYSTOLIC BLOOD PRESSURE: 85 MMHG | DIASTOLIC BLOOD PRESSURE: 50 MMHG

## 2021-11-09 VITALS — DIASTOLIC BLOOD PRESSURE: 51 MMHG | SYSTOLIC BLOOD PRESSURE: 89 MMHG

## 2021-11-09 VITALS — SYSTOLIC BLOOD PRESSURE: 91 MMHG | DIASTOLIC BLOOD PRESSURE: 52 MMHG

## 2021-11-09 VITALS — SYSTOLIC BLOOD PRESSURE: 87 MMHG | DIASTOLIC BLOOD PRESSURE: 50 MMHG

## 2021-11-09 VITALS — SYSTOLIC BLOOD PRESSURE: 89 MMHG | DIASTOLIC BLOOD PRESSURE: 53 MMHG

## 2021-11-09 VITALS — DIASTOLIC BLOOD PRESSURE: 67 MMHG | SYSTOLIC BLOOD PRESSURE: 96 MMHG

## 2021-11-09 VITALS — DIASTOLIC BLOOD PRESSURE: 58 MMHG | SYSTOLIC BLOOD PRESSURE: 88 MMHG

## 2021-11-09 DIAGNOSIS — N83.8: ICD-10-CM

## 2021-11-09 DIAGNOSIS — N80.0: ICD-10-CM

## 2021-11-09 DIAGNOSIS — N93.9: Primary | ICD-10-CM

## 2021-11-09 DIAGNOSIS — N81.4: ICD-10-CM

## 2021-11-09 DIAGNOSIS — N99.85: ICD-10-CM

## 2021-11-09 DIAGNOSIS — Z83.3: ICD-10-CM

## 2021-11-09 DIAGNOSIS — D25.2: ICD-10-CM

## 2021-11-09 DIAGNOSIS — Z90.89: ICD-10-CM

## 2021-11-09 DIAGNOSIS — J45.909: ICD-10-CM

## 2021-11-09 DIAGNOSIS — K66.0: ICD-10-CM

## 2021-11-09 DIAGNOSIS — N88.2: ICD-10-CM

## 2021-11-09 DIAGNOSIS — Z79.899: ICD-10-CM

## 2021-11-09 DIAGNOSIS — F32.A: ICD-10-CM

## 2021-11-09 DIAGNOSIS — Z79.891: ICD-10-CM

## 2021-11-09 LAB
APTT PPP: YELLOW S
BACTERIA #/AREA URNS HPF: (no result) /HPF
BASOPHILS # BLD AUTO: 0.1 10^3/UL (ref 0–0.1)
BASOPHILS NFR BLD AUTO: 1 % (ref 0–10)
BILIRUB UR QL STRIP: NEGATIVE
EOSINOPHIL # BLD AUTO: 0.1 10^3/UL (ref 0–0.3)
EOSINOPHIL NFR BLD AUTO: 1 % (ref 0–10)
FIBRINOGEN PPP-MCNC: CLEAR MG/DL
GLUCOSE UR STRIP-MCNC: NEGATIVE MG/DL
HCT VFR BLD CALC: 39 % (ref 35–52)
HGB BLD-MCNC: 13.3 G/DL (ref 11.5–16)
KETONES UR QL STRIP: (no result)
LEUKOCYTE ESTERASE UR QL STRIP: NEGATIVE
LYMPHOCYTES # BLD AUTO: 1.7 10^3/UL (ref 1–4)
LYMPHOCYTES NFR BLD AUTO: 17 % (ref 12–44)
MANUAL DIFFERENTIAL PERFORMED BLD QL: NO
MCH RBC QN AUTO: 29 PG (ref 25–34)
MCHC RBC AUTO-ENTMCNC: 34 G/DL (ref 32–36)
MCV RBC AUTO: 85 FL (ref 80–99)
MONOCYTES # BLD AUTO: 0.4 10^3/UL (ref 0–1)
MONOCYTES NFR BLD AUTO: 4 % (ref 0–12)
NEUTROPHILS # BLD AUTO: 7.3 10^3/UL (ref 1.8–7.8)
NEUTROPHILS NFR BLD AUTO: 76 % (ref 42–75)
NITRITE UR QL STRIP: NEGATIVE
PH UR STRIP: 6 [PH] (ref 5–9)
PLATELET # BLD: 214 10^3/UL (ref 130–400)
PMV BLD AUTO: 10 FL (ref 9–12.2)
PROT UR QL STRIP: NEGATIVE
RBC #/AREA URNS HPF: (no result) /HPF
SP GR UR STRIP: >=1.03 (ref 1.02–1.02)
SQUAMOUS #/AREA URNS HPF: (no result) /HPF
WBC # BLD AUTO: 9.5 10^3/UL (ref 4.3–11)
WBC #/AREA URNS HPF: (no result) /HPF

## 2021-11-09 PROCEDURE — 84703 CHORIONIC GONADOTROPIN ASSAY: CPT

## 2021-11-09 PROCEDURE — 81000 URINALYSIS NONAUTO W/SCOPE: CPT

## 2021-11-09 PROCEDURE — 88307 TISSUE EXAM BY PATHOLOGIST: CPT

## 2021-11-09 PROCEDURE — 86900 BLOOD TYPING SEROLOGIC ABO: CPT

## 2021-11-09 PROCEDURE — 85025 COMPLETE CBC W/AUTO DIFF WBC: CPT

## 2021-11-09 PROCEDURE — 86901 BLOOD TYPING SEROLOGIC RH(D): CPT

## 2021-11-09 PROCEDURE — 36415 COLL VENOUS BLD VENIPUNCTURE: CPT

## 2021-11-09 PROCEDURE — 86850 RBC ANTIBODY SCREEN: CPT

## 2021-11-09 PROCEDURE — 87081 CULTURE SCREEN ONLY: CPT

## 2021-11-09 RX ADMIN — SODIUM CHLORIDE, SODIUM LACTATE, POTASSIUM CHLORIDE, AND CALCIUM CHLORIDE PRN MLS/HR: 600; 310; 30; 20 INJECTION, SOLUTION INTRAVENOUS at 13:14

## 2021-11-09 RX ADMIN — KETOROLAC TROMETHAMINE SCH MG: 30 INJECTION, SOLUTION INTRAMUSCULAR; INTRAVENOUS at 21:00

## 2021-11-09 RX ADMIN — SODIUM CHLORIDE, SODIUM LACTATE, POTASSIUM CHLORIDE, AND CALCIUM CHLORIDE PRN MLS/HR: 600; 310; 30; 20 INJECTION, SOLUTION INTRAVENOUS at 11:50

## 2021-11-09 RX ADMIN — KETOROLAC TROMETHAMINE SCH MG: 30 INJECTION, SOLUTION INTRAMUSCULAR; INTRAVENOUS at 15:20

## 2021-11-09 NOTE — OPERATIVE REPORT
Operative Report


Date of Procedure/Surgery


Nov 9, 2021


Surgeon (s)


BRANDO CASTILLO DO


Assistant (s):  NA





Post-Operative Diagnosis





abnormal uterine bleeding


post ablation


cervical stenosis


extensive enteropelvic and paratubal adhesions


uterovaginal prolapse





Procedure Performed





RaTH, laurel salpingectomy


extensive lysis of adhesions





Description of Procedure


Anesthesia Type:  General


Estimated blood loss (mL):  minimal


Specimen(s) collected/removed


uterus, bilateral tubes and ovaries


Description of the Procedure


After informed consent was obtained, patient was taken into the operating room 

where general anesthetic was found to be adequate.  She was prepped and draped 

in the usual sterile fashion in the dorsal lithotomy position.  





A Rocha catheter was placed.  A speculum was placed in the vagina.   The cervix 

was visualized and the anterior lip was grasped with a sharp toothed tenaculum. 

The uterus was sounded and depth was approximately 8 centimeters. I placed the 

Yanelis device (8 cm) and a 3 cm collar was advanced over the cervix.  I inserted 

the Yanelis  without difficulty, inflating the balloon and securing it around the 

fornix of the cervix.  The collar was then secured with sutures at 12 o'clock.  





Attention was then turned to the patient's abdomen.  The skin was injected with 

0.25% Marcaine.  A supraumbilical incision was made about 8 mm in length.  A 

Veress needle was inserted and I  confirmed intraabdominal placement with a drop

in pressure and the saline drop test.  The opening pressure was 6 mmHg.  I then 

insufflated the abdomen to a maximum of 15 mmHg with warmed CO2 gas.   I placed 

an additional 8 mm trocar in the left abdomen lateral to the umbilicus 

approximately 15 cm lateral to the supraumbilical incision.   





The second  robotic port was placed about 12 cm lateral to the right of the 

umbilical placement.  This was an 8 mm trocar.  These were placed under direct 

visualization of the laparoscope.  0.25% Marcaine was injected prior to 

placement of all trocars.   When all placements were confirmed, the patient was 

placed in steep Trendelenburg allowing adequate visualization and the robot was 

brought in for docking.  The docking was accomplished without difficulty.





 I then took over the command of the robot utilizing the synchroseal and 

monopolar nazia.  The uterus was boggy and anteverted and there were adhesions 

in the right mid abdomen omental and bowel, left pericolic adhesion, adhesions 

in the culdesac, paratubal adhesions that all were taken down prior to 

accomplishing the hysterectomy. 





The right midabdominal omental adhesions were fairly dense and due to previous 

appendectomy.  There was a loop of bowel more loosely adherent to the right side

wall kinking the bowel.  I took these down gently without bleeding.  This took 

approximately an additional 20 minutes before I could start the hysterectomy. 





Now, I was able to visualize the round ligaments bilaterally and grasped them 

and cauterized with bipolar cautery and then cut with my nazia.   At this 

point, I then did bilateral salpingectomy.  I incised the mesosalpinx with the 

nazia. Then, I grasped the uterine ovarian ligament and  transected bilaterally

using the synchroseal.  I then moved my dissection to the posterior leaves of 

the broad ligament.  I dissected the posterior leaves of the broad ligament off 

the uterine arteries skeletonizing them bilaterally.  I then took a second clamp

with the synchroseal and with the nazia, transected the vessels away from the 

lateral aspect to the cervical stroma.  I dissected the anterior peritoneum off 

the lower uterine segment.  I continually pushed the bladder back and  I took 

excessively great care.





 I then dissected in a V fashion towards the midline between the uterosacral 

ligaments.  This allowed me to skeletonize the uterine vessels bilaterally.    

The balloon on the YANELIS was insufflated.  This allowed me to see the YANELIS 

circumferentially.  I then performed a colpotomy anteriorly and  then amputate 

with cervix away from the vaginal fornix.  I then continued the colpotomy 

circumferentially.  Once this was performed, the assistant removed the uterus 

and the tubes through the vagina. She then left the a sponge in the vagina to 

maintain the pneumoperitoneum.  .  





I then began closure of the vaginal cuff.  I closed the apices of the vaginal 

cuff with 2-0 Vicryl V lock sutures with a colposuspension through the 

uterosacral ligaments.  This suspended the apices of the vaginal cuff.   I 

extended this to the midline from both sides and overlapped the V lock sutures 

in the midline.   Excellent closure is noted and hemostasis is achieved.





All the needles were removed from the patient's abdomen.  





Now, the robotic instruments were removed and the robot was docked back to 

laparoscopy.  The pelvis was irrigated.  There was no active bleeding noted.  


Bilateral ureters were seen the entire time during the surgery and were 

peristalsing.  There was no excessive bleeding noted. 





The trocars were removed under direct visualization.  The laparoscopic sites 

were visualized and found to be hemostatic.  The trocar sites were injected with

0.25% Marcaine.  The skin incisions were closed with 4-0 Monocryl in a 

subcuticular fashion and then with Dermabond.  Op sites were placed over the 

incision sites.  The instruments were removed from the vagina and I noted there 

were no abrasions.   





Sponge, lap, needle and instrument counts correct times two.  Patient was 

awakened and taken to recovery in a stable condition.


Findings of the Procedure


uterus was boggy and anteverted


adhesions in the right mid abdomen omental and bowel


left pericolic adhesion


adhesions in the culdesac


paratubal adhesions


bilateral tubal interruption/fallope rings


bilaterral functional ovarian cysts


 bilateral tubal cysts





Allergies and Home Medications


Allergies


Coded Allergies:  


     No Known Drug Allergies (Unverified , 1/7/19)





Patient Home Medication List


Home Medication List Reviewed:  Yes


Acetaminophen (Acetaminophen) 500 Mg Tablet, 1,000 MG PO Q8HR


   Prescribed by: BRANDO CASTILLO on 11/9/21 1523


Bethanechol Chloride (Bethanechol Chloride) 25 Mg Tablet, 25 MG PO ACHS


   Prescribed by: BRANDO CASTILLO on 11/11/21 1453


Fexofenadine HCl (Allegra Allergy) 180 Mg Tablet, 180 MG PO DAILY, (Reported)


   Entered as Reported by: OFELIA YIN on 11/3/21 1308


   Last Action: Last Taken Edited


Ibuprofen (Ibu) 600 Mg Tablet, 600 MG PO Q6HR


   Prescribed by: BRANDO CASTILLO on 11/9/21 1523


Multivitamin (Multivitamin) 1 Each Tablet, 1 EACH PO DAILY, (Reported)


   Entered as Reported by: OFELIA YIN on 11/3/21 1308


   Last Action: Last Taken Edited


Oxycodone Hcl (Oxyir Tablet) 5 Mg Tab, 5 MG PO Q4HR PRN for PAIN-SEE DOSE 

INSTRUCTIONS


   Prescribed by: BRANDO CASTILLO on 11/9/21 1523


Simethicone (Mi-Acid) 80 Mg Tab.chew, 80 MG PO Q2H PRN for GAS


   Prescribed by: BRANDO CASTILLO on 11/11/21 1453


Tamsulosin HCl (Flomax) 0.4 Mg Cap, 0.4 MG PO DAILY@1800


   Prescribed by: BRANDO CASTILLO on 11/11/21 1453











BRANDO CASTILLO DO                Nov 9, 2021 14:58

## 2021-11-09 NOTE — ANESTHESIA-GENERAL POST-OP
General


Patient Condition


Mental Status/LOC:  Same as Preop


Cardiovascular:  Satisfactory


Nausea/Vomiting:  Absent


Respiratory:  Satisfactory


Pain:  Controlled


Complications:  Absent





Post Op Complications


Complications


None





Follow Up Care/Instructions


Patient Instructions


None needed.





Anesthesia/Patient Condition


Patient Condition


Patient is doing well, no complaints, stable vital signs, no apparent adverse 

anesthesia problems.   


No complications reported per nursing.











HOLLY IBRAHIM CRNA             Nov 9, 2021 15:12

## 2021-11-09 NOTE — PROGRESS NOTE-PRE OPERATIVE
Pre-Operative Progress Note


H&P Reviewed


The H&P was reviewed, patient examined and no changes noted.


Date Seen by Provider:  Nov 9, 2021


Time Seen by Provider:  10:40


Date H&P Reviewed:  Nov 9, 2021


Time H&P Reviewed:  10:40


Pre-Operative Diagnosis:  abnormal uterine bleeding s/p uterine ablation











BRANDO CASTILLO DO                Nov 9, 2021 10:47

## 2021-11-10 ENCOUNTER — HOSPITAL ENCOUNTER (OUTPATIENT)
Dept: HOSPITAL 75 - ER | Age: 36
Setting detail: OBSERVATION
LOS: 1 days | Discharge: HOME | End: 2021-11-11
Attending: OBSTETRICS & GYNECOLOGY | Admitting: OBSTETRICS & GYNECOLOGY
Payer: COMMERCIAL

## 2021-11-10 VITALS — WEIGHT: 103.62 LBS | BODY MASS INDEX: 19.07 KG/M2 | HEIGHT: 61.81 IN

## 2021-11-10 VITALS — SYSTOLIC BLOOD PRESSURE: 101 MMHG | DIASTOLIC BLOOD PRESSURE: 55 MMHG

## 2021-11-10 VITALS — SYSTOLIC BLOOD PRESSURE: 102 MMHG | DIASTOLIC BLOOD PRESSURE: 48 MMHG

## 2021-11-10 VITALS — DIASTOLIC BLOOD PRESSURE: 57 MMHG | SYSTOLIC BLOOD PRESSURE: 104 MMHG

## 2021-11-10 VITALS — DIASTOLIC BLOOD PRESSURE: 58 MMHG | SYSTOLIC BLOOD PRESSURE: 120 MMHG

## 2021-11-10 DIAGNOSIS — Z98.51: ICD-10-CM

## 2021-11-10 DIAGNOSIS — Z90.710: ICD-10-CM

## 2021-11-10 DIAGNOSIS — F32.A: ICD-10-CM

## 2021-11-10 DIAGNOSIS — M79.7: ICD-10-CM

## 2021-11-10 DIAGNOSIS — G89.18: Primary | ICD-10-CM

## 2021-11-10 DIAGNOSIS — Z79.899: ICD-10-CM

## 2021-11-10 DIAGNOSIS — Z90.89: ICD-10-CM

## 2021-11-10 DIAGNOSIS — Z79.891: ICD-10-CM

## 2021-11-10 DIAGNOSIS — J45.909: ICD-10-CM

## 2021-11-10 DIAGNOSIS — Z83.3: ICD-10-CM

## 2021-11-10 LAB
ALBUMIN SERPL-MCNC: 4.5 GM/DL (ref 3.2–4.5)
ALP SERPL-CCNC: 46 U/L (ref 40–136)
ALT SERPL-CCNC: 14 U/L (ref 0–55)
APTT BLD: 33 SEC (ref 24–35)
APTT PPP: YELLOW S
BACTERIA #/AREA URNS HPF: NEGATIVE /HPF
BASOPHILS # BLD AUTO: 0 10^3/UL (ref 0–0.1)
BASOPHILS NFR BLD AUTO: 0 % (ref 0–10)
BILIRUB SERPL-MCNC: 1.1 MG/DL (ref 0.1–1)
BILIRUB UR QL STRIP: NEGATIVE
BUN/CREAT SERPL: 14
CALCIUM SERPL-MCNC: 8.8 MG/DL (ref 8.5–10.1)
CHLORIDE SERPL-SCNC: 100 MMOL/L (ref 98–107)
CO2 SERPL-SCNC: 15 MMOL/L (ref 21–32)
CREAT SERPL-MCNC: 0.74 MG/DL (ref 0.6–1.3)
EOSINOPHIL # BLD AUTO: 0 10^3/UL (ref 0–0.3)
EOSINOPHIL NFR BLD AUTO: 0 % (ref 0–10)
EOSINOPHIL NFR BLD MANUAL: 1 %
FIBRINOGEN PPP-MCNC: CLEAR MG/DL
GFR SERPLBLD BASED ON 1.73 SQ M-ARVRAT: 89 ML/MIN
GLUCOSE SERPL-MCNC: 191 MG/DL (ref 70–105)
GLUCOSE UR STRIP-MCNC: NEGATIVE MG/DL
HCT VFR BLD CALC: 30 % (ref 35–52)
HCT VFR BLD CALC: 33 % (ref 35–52)
HCT VFR BLD CALC: 35 % (ref 35–52)
HGB BLD-MCNC: 10.1 G/DL (ref 11.5–16)
HGB BLD-MCNC: 11.1 G/DL (ref 11.5–16)
HGB BLD-MCNC: 11.9 G/DL (ref 11.5–16)
INR PPP: 1.1 (ref 0.8–1.4)
KETONES UR QL STRIP: (no result)
LEUKOCYTE ESTERASE UR QL STRIP: NEGATIVE
LIPASE SERPL-CCNC: 20 U/L (ref 8–78)
LYMPHOCYTES # BLD AUTO: 0.9 10^3/UL (ref 1–4)
LYMPHOCYTES # BLD AUTO: 1.1 10^3/UL (ref 1–4)
LYMPHOCYTES # BLD AUTO: 1.5 10^3/UL (ref 1–4)
LYMPHOCYTES NFR BLD AUTO: 10 % (ref 12–44)
LYMPHOCYTES NFR BLD AUTO: 4 % (ref 12–44)
LYMPHOCYTES NFR BLD AUTO: 6 % (ref 12–44)
MANUAL DIFFERENTIAL PERFORMED BLD QL: NO
MANUAL DIFFERENTIAL PERFORMED BLD QL: YES
MANUAL DIFFERENTIAL PERFORMED BLD QL: YES
MCH RBC QN AUTO: 29 PG (ref 25–34)
MCHC RBC AUTO-ENTMCNC: 33 G/DL (ref 32–36)
MCHC RBC AUTO-ENTMCNC: 34 G/DL (ref 32–36)
MCHC RBC AUTO-ENTMCNC: 34 G/DL (ref 32–36)
MCV RBC AUTO: 84 FL (ref 80–99)
MCV RBC AUTO: 86 FL (ref 80–99)
MCV RBC AUTO: 86 FL (ref 80–99)
MONOCYTES # BLD AUTO: 0.8 10^3/UL (ref 0–1)
MONOCYTES # BLD AUTO: 0.9 10^3/UL (ref 0–1)
MONOCYTES # BLD AUTO: 1 10^3/UL (ref 0–1)
MONOCYTES NFR BLD AUTO: 4 % (ref 0–12)
MONOCYTES NFR BLD AUTO: 5 % (ref 0–12)
MONOCYTES NFR BLD AUTO: 5 % (ref 0–12)
MONOCYTES NFR BLD: 2 %
MONOCYTES NFR BLD: 2 %
NEUTROPHILS # BLD AUTO: 13 10^3/UL (ref 1.8–7.8)
NEUTROPHILS # BLD AUTO: 16.9 10^3/UL (ref 1.8–7.8)
NEUTROPHILS # BLD AUTO: 19.6 10^3/UL (ref 1.8–7.8)
NEUTROPHILS NFR BLD AUTO: 84 % (ref 42–75)
NEUTROPHILS NFR BLD AUTO: 88 % (ref 42–75)
NEUTROPHILS NFR BLD AUTO: 91 % (ref 42–75)
NEUTS BAND NFR BLD MANUAL: 85 %
NEUTS BAND NFR BLD MANUAL: 89 %
NEUTS BAND NFR BLD: 4 %
NITRITE UR QL STRIP: NEGATIVE
PH UR STRIP: 5.5 [PH] (ref 5–9)
PLATELET # BLD: 177 10^3/UL (ref 130–400)
PLATELET # BLD: 196 10^3/UL (ref 130–400)
PLATELET # BLD: 222 10^3/UL (ref 130–400)
PMV BLD AUTO: 10 FL (ref 9–12.2)
PMV BLD AUTO: 9.6 FL (ref 9–12.2)
PMV BLD AUTO: 9.7 FL (ref 9–12.2)
POTASSIUM SERPL-SCNC: 4.6 MMOL/L (ref 3.6–5)
PROT SERPL-MCNC: 6.9 GM/DL (ref 6.4–8.2)
PROT UR QL STRIP: NEGATIVE
PROTHROMBIN TIME: 14.8 SEC (ref 12.2–14.7)
RBC #/AREA URNS HPF: (no result) /HPF
RBC MORPH BLD: NORMAL
SODIUM SERPL-SCNC: 128 MMOL/L (ref 135–145)
SP GR UR STRIP: <=1.005 (ref 1.02–1.02)
VARIANT LYMPHS NFR BLD MANUAL: 12 %
VARIANT LYMPHS NFR BLD MANUAL: 5 %
WBC # BLD AUTO: 15.4 10^3/UL (ref 4.3–11)
WBC # BLD AUTO: 19.2 10^3/UL (ref 4.3–11)
WBC # BLD AUTO: 21.5 10^3/UL (ref 4.3–11)
WBC #/AREA URNS HPF: (no result) /HPF

## 2021-11-10 PROCEDURE — 87088 URINE BACTERIA CULTURE: CPT

## 2021-11-10 PROCEDURE — 74019 RADEX ABDOMEN 2 VIEWS: CPT

## 2021-11-10 PROCEDURE — 85025 COMPLETE CBC W/AUTO DIFF WBC: CPT

## 2021-11-10 PROCEDURE — 99284 EMERGENCY DEPT VISIT MOD MDM: CPT

## 2021-11-10 PROCEDURE — 71045 X-RAY EXAM CHEST 1 VIEW: CPT

## 2021-11-10 PROCEDURE — 94664 DEMO&/EVAL PT USE INHALER: CPT

## 2021-11-10 PROCEDURE — 85730 THROMBOPLASTIN TIME PARTIAL: CPT

## 2021-11-10 PROCEDURE — 96361 HYDRATE IV INFUSION ADD-ON: CPT

## 2021-11-10 PROCEDURE — 36415 COLL VENOUS BLD VENIPUNCTURE: CPT

## 2021-11-10 PROCEDURE — 87040 BLOOD CULTURE FOR BACTERIA: CPT

## 2021-11-10 PROCEDURE — 96374 THER/PROPH/DIAG INJ IV PUSH: CPT

## 2021-11-10 PROCEDURE — 85027 COMPLETE CBC AUTOMATED: CPT

## 2021-11-10 PROCEDURE — 83605 ASSAY OF LACTIC ACID: CPT

## 2021-11-10 PROCEDURE — 80053 COMPREHEN METABOLIC PANEL: CPT

## 2021-11-10 PROCEDURE — 85610 PROTHROMBIN TIME: CPT

## 2021-11-10 PROCEDURE — 85007 BL SMEAR W/DIFF WBC COUNT: CPT

## 2021-11-10 PROCEDURE — 96376 TX/PRO/DX INJ SAME DRUG ADON: CPT

## 2021-11-10 PROCEDURE — 83690 ASSAY OF LIPASE: CPT

## 2021-11-10 PROCEDURE — 96375 TX/PRO/DX INJ NEW DRUG ADDON: CPT

## 2021-11-10 PROCEDURE — 81000 URINALYSIS NONAUTO W/SCOPE: CPT

## 2021-11-10 PROCEDURE — 74177 CT ABD & PELVIS W/CONTRAST: CPT

## 2021-11-10 RX ADMIN — SIMETHICONE PRN MG: 80 TABLET, CHEWABLE ORAL at 12:24

## 2021-11-10 RX ADMIN — ACETAMINOPHEN SCH MG: 500 TABLET ORAL at 09:17

## 2021-11-10 RX ADMIN — BETHANECHOL CHLORIDE SCH MG: 25 TABLET ORAL at 18:04

## 2021-11-10 RX ADMIN — SIMETHICONE PRN MG: 80 TABLET, CHEWABLE ORAL at 09:03

## 2021-11-10 RX ADMIN — Medication PRN ML: at 15:22

## 2021-11-10 RX ADMIN — KETOROLAC TROMETHAMINE SCH MG: 30 INJECTION, SOLUTION INTRAMUSCULAR; INTRAVENOUS at 09:03

## 2021-11-10 RX ADMIN — SIMETHICONE PRN MG: 80 TABLET, CHEWABLE ORAL at 14:16

## 2021-11-10 RX ADMIN — KETOROLAC TROMETHAMINE SCH MG: 30 INJECTION, SOLUTION INTRAMUSCULAR; INTRAVENOUS at 21:24

## 2021-11-10 RX ADMIN — ACETAMINOPHEN SCH MG: 500 TABLET ORAL at 17:04

## 2021-11-10 RX ADMIN — METRONIDAZOLE SCH MLS/HR: 5 INJECTION, SOLUTION INTRAVENOUS at 14:17

## 2021-11-10 RX ADMIN — METOCLOPRAMIDE SCH MG: 5 INJECTION, SOLUTION INTRAMUSCULAR; INTRAVENOUS at 18:10

## 2021-11-10 RX ADMIN — Medication PRN ML: at 09:03

## 2021-11-10 RX ADMIN — KETOROLAC TROMETHAMINE SCH MG: 30 INJECTION, SOLUTION INTRAMUSCULAR; INTRAVENOUS at 15:22

## 2021-11-10 RX ADMIN — SODIUM CHLORIDE, SODIUM LACTATE, POTASSIUM CHLORIDE, AND CALCIUM CHLORIDE SCH MLS/HR: 600; 310; 30; 20 INJECTION, SOLUTION INTRAVENOUS at 18:10

## 2021-11-10 RX ADMIN — BETHANECHOL CHLORIDE SCH MG: 25 TABLET ORAL at 21:24

## 2021-11-10 RX ADMIN — METRONIDAZOLE SCH MLS/HR: 5 INJECTION, SOLUTION INTRAVENOUS at 21:25

## 2021-11-10 RX ADMIN — METOCLOPRAMIDE SCH MG: 5 INJECTION, SOLUTION INTRAMUSCULAR; INTRAVENOUS at 15:22

## 2021-11-10 RX ADMIN — SODIUM CHLORIDE, SODIUM LACTATE, POTASSIUM CHLORIDE, AND CALCIUM CHLORIDE SCH MLS/HR: 600; 310; 30; 20 INJECTION, SOLUTION INTRAVENOUS at 09:09

## 2021-11-10 RX ADMIN — METOCLOPRAMIDE SCH MG: 5 INJECTION, SOLUTION INTRAMUSCULAR; INTRAVENOUS at 09:03

## 2021-11-10 RX ADMIN — Medication PRN ML: at 05:42

## 2021-11-10 RX ADMIN — SIMETHICONE PRN MG: 80 TABLET, CHEWABLE ORAL at 18:10

## 2021-11-10 NOTE — DIAGNOSTIC IMAGING REPORT
INDICATION: Abdominal pain post hysterectomy one day earlier.



FINDINGS: Upright AP view of the chest is obtained. Heart size

and pulmonary vascularity are within normal limits. The lungs

appear clear bilaterally. There is presumed postoperative

pneumoperitoneum.



IMPRESSION: No acute abnormality seen in the chest. Postoperative

pneumoperitoneum is present.



Dictated by: 



  Dictated on workstation # [id number]

## 2021-11-10 NOTE — ED ABDOMINAL PAIN
General


Chief Complaint:  Abdominal/GI Problems


Stated Complaint:  POST OP HYSTERECTOMY SURGERY PAIN


Source of Information:  Patient


Exam Limitations:  No Limitations





History of Present Illness


Date Seen by Provider:  Nov 10, 2021


Time Seen by Provider:  04:25


Initial Comments


Patient to the ER by private conveyance with her significant other and chief 

complaint that about 2:00 this morning she started having severe progressive 

worsening pain 10 out of 10 all over in her abdomen and rigidity.  She is not 

able to pass gas since she left the hospital yesterday for a complete 

hysterectomy and salpingo-oophorectomy.  Gynecologist is Dr. Castillo who called 

ahead.  Patient called her gynecologist who recommended she come out here.  She 

did take a dose of Norco 5 x 325 at 2:00 in the morning which only relieved her 

superficial pain but not the deep pain in her abdomen.  She has not been all to 

pass a stool.  She says her urine stream was weak at first but she says that is 

improving.  She is denying dysuria.  She has a history of 2 C-sections in the 

past.  She says there were several adhesions lysed during the surgery.  She is 

not having nausea or fever.  She has a history of fibromyalgia.





Allergies and Home Medications


Allergies


Coded Allergies:  


     No Known Drug Allergies (Unverified , 19)





Patient Home Medication List


Home Medication List Reviewed:  Yes


Acetaminophen (Acetaminophen) 500 Mg Tablet, 1,000 MG PO Q8HR


   Prescribed by: BRANDO CASTILLO on 21


Fexofenadine HCl (Allegra Allergy) 180 Mg Tablet, 180 MG PO DAILY, (Reported)


   Entered as Reported by: OFELIA YIN on 11/3/21 1308


Ibuprofen (Ibu) 600 Mg Tablet, 600 MG PO Q6HR


   Prescribed by: BRANDO CASTILLO on 21


Multivitamin (Multivitamin) 1 Each Tablet, 1 EACH PO DAILY, (Reported)


   Entered as Reported by: OFELIA YIN on 11/3/21 1308


Oxycodone Hcl (Oxyir Tablet) 5 Mg Tab, 5 MG PO Q4HR PRN for PAIN-SEE DOSE 

INSTRUCTIONS


   Prescribed by: BRANDO CASTILLO on 21 1523


Discontinued Medications


Fluoxetine HCl (Prozac) 40 Mg Capsule, 40 MG PO DAILY, (Reported)


   Discontinued Reason: No Longer Taking


   Entered as Reported by: NAVNEET WATERMAN on 19 0714


Hydrocodone Bit/Acetaminophen (Lortab  5 Mg Tablet) 1 Tab Tab, 1 TAB PO Q6H PRN 

for PAIN-MODERATE


   Discontinued Reason: No Longer Taking


   Prescribed by: BRANDO CASTILLO on 19 1117


Ibuprofen (Ibuprofen) 600 Mg Tablet, 600 MG PO Q6H


   Discontinued Reason: No Longer Taking


   Prescribed by: BRANDO CASTILLO on 19 1117


Ranitidine HCl (Acid Reducer (RANITIDINE)) 75 Mg Tablet, 75 MG PO DAILY, 

(Reported)


   Discontinued Reason: No Longer Taking


   Entered as Reported by: RODOLFO HUANG on 19 1209





Review of Systems


Review of Systems


Constitutional:  No chills, No fever, No malaise


EENTM:  No Blurred Vision, No Double Vision


Respiratory:  Denies Cough, Denies Shortness of Air


Cardiovascular:  Denies Chest Pain, Denies Lightheadedness


Gastrointestinal:  Denies Abdomen Distended; Abdominal Pain; Denies Constipated,

Denies Diarrhea, Denies Nausea


Genitourinary:  Denies Burning, Denies Discharge


Musculoskeletal:  No back pain, No joint pain


Skin:  No pruritus, No rash


Psychiatric/Neurological:  Denies Headache, Denies Numbness





All Other Systems Reviewed


Negative Unless Noted:  Yes





Past Medical-Social-Family Hx


Patient Social History


Tobacco Use?:  No


Use of E-Cig and/or Vaping dev:  No


Substance use?:  No


Alcohol Use?:  No





Immunizations Up To Date


Tetanus Booster (TDap):  Less than 5yrs


PED Vaccines UTD:  No





Seasonal Allergies


Seasonal Allergies:  Yes





Past Medical History


Surgeries:  Yes (D&C x1, CS x2)


Appendectomy, Tubal Ligation


Respiratory:  Yes (asthma with seasonal allergies)


Asthma


Currently Using CPAP:  No


Currently Using BIPAP:  No


Cardiac:  Yes (HX SVT )


Neurological:  No


Reproductive Disorders:  No


Female Reproductive Disorders:  Denies, Menstrual Problems


Sexually Transmitted Disease:  No


HIV/AIDS:  No


Genitourinary:  No


Gastrointestinal:  No


Musculoskeletal:  Yes


Fibromyalgia


Endocrine:  No


HEENT:  Yes (GLASSES)


Loss of Vision:  Bilateral


Hearing Impairment:  Denies


Cancer:  No


Psychosocial:  Yes (pp depression with last pregnancy)


Depression


Integumentary:  No


Blood Disorders:  No


Adverse Reaction/Blood Tranf:  No (N/A)





Family Medical History





Family history: Diabetes mellitus


  20 MOTHER, Onset:40's - 50


Family history: Glaucoma


  19 MOTHER, Onset:50's - 60


Psychotic disorder


  20 MOTHER (depression)


Diabetes, Psychiatric Problems





Physical Exam


Vital Signs





Vital Signs - First Documented








 11/10/21





 04:25


 


Temp 37.1


 


Pulse 120


 


Resp 22


 


B/P (MAP) 142/76 (98)


 


Pulse Ox 100


 


O2 Delivery Room Air





Capillary Refill :


Height/Weight/BMI


Height: 5'1.00"


Weight: 118lbs. 0.0oz. [id number]; 20.51 BMI


Method:Stated


General Appearance:  WD/WN, moderate distress


HEENT:  PERRL/EOMI, pharynx normal


Neck:  full range of motion, normal inspection


Respiratory:  lungs clear, normal breath sounds, no respiratory distress, no 

accessory muscle use


Cardiovascular:  normal peripheral pulses, regular rate, rhythm


Peripheral Pulses:  2+ Radial Pulses (R), 2+ Radial Pulses (L)


Gastrointestinal:  normal bowel sounds (Hyperactive); No soft; guarding, 

tenderness (All 4 quadrants)


Extremities:  normal range of motion, normal inspection, normal capillary refill


Neurologic/Psychiatric:  alert; No normal mood/affect (Anxious affect); oriented

x 3


Skin:  normal color, warm/dry, other (Abdominal wounds are clean dry intact and 

dressed.  No erythema induration or exudate)





Focused Exam


Lactate Level


11/10/21 04:40: Lactic Acid Level 2.50*H





Lactic Acid Level





Laboratory Tests








Test


 11/10/21


04:40


 


Lactic Acid Level


 2.50 MMOL/L


(0.50-2.00)  *H











Progress/Results/Core Measures


Results/Orders


Lab Results





Laboratory Tests








Test


 11/10/21


04:25 11/10/21


04:40 Range/Units


 


 


Urine Color YELLOW    


 


Urine Clarity CLEAR    


 


Urine pH 5.5   5-9  


 


Urine Specific Gravity <=1.005   1.016-1.022  


 


Urine Protein NEGATIVE   NEGATIVE  


 


Urine Glucose (UA) NEGATIVE   NEGATIVE  


 


Urine Ketones 1+ H  NEGATIVE  


 


Urine Nitrite NEGATIVE   NEGATIVE  


 


Urine Bilirubin NEGATIVE   NEGATIVE  


 


Urine Urobilinogen 0.2   < = 1.0  MG/DL


 


Urine Leukocyte Esterase NEGATIVE   NEGATIVE  


 


Urine RBC (Auto) 1+ H  NEGATIVE  


 


Urine RBC 2-5 H   /HPF


 


Urine WBC RARE    /HPF


 


Urine Crystals NONE    /LPF


 


Urine Bacteria NEGATIVE    /HPF


 


Urine Casts NONE    /LPF


 


Urine Mucus NEGATIVE    /LPF


 


Urine Culture Indicated NO    


 


White Blood Count


 


 21.5 H


 4.3-11.0


10^3/uL


 


Red Blood Count


 


 4.11 


 3.80-5.11


10^6/uL


 


Hemoglobin  11.9  11.5-16.0  g/dL


 


Hematocrit  35  35-52  %


 


Mean Corpuscular Volume  84  80-99  fL


 


Mean Corpuscular Hemoglobin  29  25-34  pg


 


Mean Corpuscular Hemoglobin


Concent 


 34 


 32-36  g/dL





 


Red Cell Distribution Width  12.1  10.0-14.5  %


 


Platelet Count


 


 222 


 130-400


10^3/uL


 


Mean Platelet Volume  10.0  9.0-12.2  fL


 


Immature Granulocyte % (Auto)  1   %


 


Neutrophils (%) (Auto)  91 H 42-75  %


 


Lymphocytes (%) (Auto)  4 L 12-44  %


 


Monocytes (%) (Auto)  4  0-12  %


 


Eosinophils (%) (Auto)  0  0-10  %


 


Basophils (%) (Auto)  0  0-10  %


 


Neutrophils # (Auto)


 


 19.6 H


 1.8-7.8


10^3/uL


 


Lymphocytes # (Auto)


 


 0.9 L


 1.0-4.0


10^3/uL


 


Monocytes # (Auto)


 


 0.9 


 0.0-1.0


10^3/uL


 


Eosinophils # (Auto)


 


 0.0 


 0.0-0.3


10^3/uL


 


Basophils # (Auto)


 


 0.0 


 0.0-0.1


10^3/uL


 


Immature Granulocyte # (Auto)


 


 0.1 


 0.0-0.1


10^3/uL


 


Neutrophils % (Manual)  89   %


 


Lymphocytes % (Manual)  5   %


 


Monocytes % (Manual)  2   %


 


Band Neutrophils  4   %


 


Prothrombin Time  14.8 H 12.2-14.7  SEC


 


INR Comment  1.1  0.8-1.4  


 


Activated Partial


Thromboplast Time 


 33 


 24-35  SEC





 


Sodium Level  128 L 135-145  MMOL/L


 


Potassium Level  4.6  3.6-5.0  MMOL/L


 


Chloride Level  100    MMOL/L


 


Carbon Dioxide Level  15 L 21-32  MMOL/L


 


Anion Gap  13  5-14  MMOL/L


 


Blood Urea Nitrogen  10  7-18  MG/DL


 


Creatinine


 


 0.74 


 0.60-1.30


MG/DL


 


Estimat Glomerular Filtration


Rate 


 89 


  





 


BUN/Creatinine Ratio  14   


 


Glucose Level  191 H   MG/DL


 


Lactic Acid Level


 


 2.50 *H


 0.50-2.00


MMOL/L


 


Calcium Level  8.8  8.5-10.1  MG/DL


 


Corrected Calcium  8.4 L 8.5-10.1  MG/DL


 


Total Bilirubin  1.1 H 0.1-1.0  MG/DL


 


Aspartate Amino Transf


(AST/SGOT) 


 22 


 5-34  U/L





 


Alanine Aminotransferase


(ALT/SGPT) 


 14 


 0-55  U/L





 


Alkaline Phosphatase  46    U/L


 


Total Protein  6.9  6.4-8.2  GM/DL


 


Albumin  4.5  3.2-4.5  GM/DL


 


Lipase  20  8-78  U/L








My Orders





Orders - PARVEEN CHEN


Ua Culture If Indicated (11/10/21 04:23)


Cbc With Automated Diff (11/10/21 04:23)


Comprehensive Metabolic Panel (11/10/21 04:)


Lipase (11/10/21 04:23)


Ct Abdomen/Pelvis W (11/10/21 04:33)


Fentanyl  Inj (Sublimaze Injection) (11/10/21 04:45)


Ed Iv/Invasive Line Start (11/10/21 04:33)


Blood Culture (11/10/21 04:33)


Sputum Culture (11/10/21 04:33)


Urine Culture (11/10/21 04:33)


Protime With Inr (11/10/21 04:33)


Partial Thromboplastin Time (11/10/21 04:33)


Chest 1 View, Ap/Pa Only (11/10/21 04:33)


Ed Iv/Invasive Line Start (11/10/21 04:33)


Ed Iv/Invasive Line Start (11/10/21 04:33)


Vital Signs Adult Sepsis Patie Q15M (11/10/21 04:33)


O2 (11/10/21 04:33)


Remove Rings In Anticipation O (11/10/21 04:33)


Lactic Acid Analyzer (11/10/21 04:33)


Lactated Ringers (Lr 1000 Ml Iv Solution (11/10/21 04:45)


Ceftriaxone (Rocephin) (11/10/21 04:45)


Metronidazole 500mg/100ml Ivpb (Flagyl 5 (11/10/21 04:45)


Manual Differential (11/10/21 04:40)


Ed Iv/Invasive Line Start (11/10/21 05:14)


Ns Iv 500 Ml (Sodium Chloride 0.9%) (11/10/21 05:15)


Iohexol Injection (Omnipaque 350 Mg/Ml 1 (11/10/21 05:30)


Received Contrast (Hold Metformin- Contr (11/10/21 05:30)


Sodium Chloride Flush (Catheter Flush Sy (11/10/21 05:30)


Ns (Ivpb) (Sodium Chloride 0.9% Ivpb Bag (11/10/21 05:30)





Medications Given in ED





Current Medications








 Medications  Dose


 Ordered  Sig/Rhonda


 Route  Start Time


 Stop Time Status Last Admin


Dose Admin


 


 Ceftriaxone


 Sodium 1000 mg/


 Sterile Water  10 ml @ 


 200 mls/hr  ONCE  ONCE


 IV  11/10/21 04:45


 11/10/21 04:47 DC 11/10/21 04:58


200 MLS/HR


 


 Fentanyl Citrate  50 mcg  ONCE  ONCE


 IVP  11/10/21 04:45


 11/10/21 04:46 DC 11/10/21 04:55


50 MCG


 


 Iohexol  100 ml  ONCE  ONCE


 IV  11/10/21 05:30


 11/10/21 05:31 DC 11/10/21 05:42


75 ML


 


 Lactated Ringer's  1,000 ml @ 


 0 mls/hr  Q0M ONCE


 IV  11/10/21 04:45


 11/10/21 04:46 DC 11/10/21 04:55


999 MLS/HR


 


 Metronidazole  100 ml @ 


 100 mls/hr  ONCE  ONCE


 IV  11/10/21 04:45


 11/10/21 05:44 DC 11/10/21 05:08


100 MLS/HR


 


 Sodium Chloride  10 ml  AS NEEDED  PRN


 IV  11/10/21 05:30


    11/10/21 05:42


10 ML


 


 Sodium Chloride  100 ml  ONCE  ONCE


 IV  11/10/21 05:30


 11/10/21 05:31 DC 11/10/21 05:42


80 ML








Vital Signs/I&O











 11/10/21





 04:25


 


Temp 37.1


 


Pulse 120


 


Resp 22


 


B/P (MAP) 142/76 (98)


 


Pulse Ox 100


 


O2 Delivery Room Air











Progress


Progress Note :  


   Time:  04:39


Progress Note


Concern for postop complication including infection, bowel, ureter etc. injury. 

Septic work-up based on tachycardia and tachypneic 24.  Her tachypnea could also

be because of pain.  We will give her 50 mcg of IV fentanyl to start and see how

she handles that.  She has a reasonable blood pressure of 120/80.  Heart rate is

in the 100-110 range.  Her abdomen is not soft and is tender throughout.  CT 

with IV contrast.  1 L of fluids would be 20 mL/kg.  Antibiotic coverage would 

be Rocephin and metronidazole.





Diagnostic Imaging





   Diagonstic Imaging:  CT


   Plain Films/CT/US/NM/MRI:  abdomen, pelvis


Comments


                 [email or web address] [place], [place]





NAME:   JAYESH GAYLE


MED REC#:   [id number]


ACCOUNT#:   [id number]


PT STATUS:   REG ER


:   1985


PHYSICIAN:   PARVEEN CHEN MD


ADMIT DATE:   11/10/21/ER


                                   ***Draft***


Date of Exam:11/10/21





CT ABDOMEN/PELVIS W








PROCEDURE: CT abdomen and pelvis with contrast.





TECHNIQUE: Multiple contiguous axial images were obtained through


the abdomen and pelvis after administration of intravenous


contrast. Auto Exposure Controls were utilized during the CT exam


to meet ALARA standards for radiation dose reduction. All CT


scans use one or more of the following dose optimizing


techniques: automated exposure control, MA and/or KvP adjustment


based on patient size and exam type or iterative reconstruction.





INDICATION:  Abdominal pain one day after hysterectomy.





FINDINGS: There is moderate to large amount of pneumoperitoneum


and additional gas seen within the mesentery. There is a small


amount of peritoneal free fluid. Partially opacified urinary


bladder is unremarkable. There is no evidence of perivesicular


contrast extravasation. There is no evidence of significant


hemorrhage at the hysterectomy site. Great vessels of the abdomen


are unremarkable. Ureters demonstrate normal course bilaterally.


There is no evidence of urine leak. No hepatic, gallbladder,


pancreatic, adrenal gland or splenic lesion is identified. The


appendix is not visualized and may be surgically absent.





IMPRESSION: Postoperative findings of recent hysterectomy with


moderate amount of pneumoperitoneum. No other definite acute


complication is identified.








  Dictated on workstation # [id number]








Dict:   11/10/21 0540


Trans:   11/10/21 0545


 [id number]





Interpreted by:     CELINE PAYNE MD


Electronically signed by:


   Reviewed:  Reviewed by Me








   Diagonstic Imaging:  Xray


   Plain Films/CT/US/NM/MRI:  chest


Comments


                 [facility] [place] [place]





NAME:   JAYESH GAYLE


MED REC#:   [id number]


ACCOUNT#:   [id number]


PT STATUS:   REG ER


:   1985


PHYSICIAN:   PARVEEN CHEN MD


ADMIT DATE:   11/10/21/ER


                                   ***Draft***


Date of Exam:11/10/21





CHEST 1 VIEW, AP/PA ONLY








INDICATION: Abdominal pain post hysterectomy one day earlier.





FINDINGS: Upright AP view of the chest is obtained. Heart size


and pulmonary vascularity are within normal limits. The lungs


appear clear bilaterally. There is presumed postoperative


pneumoperitoneum.





IMPRESSION: No acute abnormality seen in the chest. Postoperative


pneumoperitoneum is present.





  Dictated on workstation # [id number]








Dict:   11/10/21 0536


Trans:   11/10/21 0542


 [id number]





Interpreted by:     CELINE PAYNE MD


Electronically signed by:


   Reviewed:  Reviewed by Me





Departure


Communication (Admissions)


Time/Spoke to Admitting Phy:  05:45


Discussed the case with Dr. Aguilar who is familiar with the patient and she 

recommends observation of the patient with n.p.o. status, maintenance IV fluids.





Impression





   Primary Impression:  


   Postoperative pain


   Additional Impression:  


   Hyponatremia


Disposition:   ADMITTED AS INPATIENT


Condition:  Stable





Admissions


Decision to Admit Reason:  Admit from ER (General)


Decision to Admit/Date:  Nov 10, 2021


Time/Decision to Admit Time:  05:40





Departure-Patient Inst.


Referrals:  


MANDY DWYER MD (PCP/Family)


Primary Care Physician











PARVEEN CHEN                 Nov 10, 2021 04:43

## 2021-11-10 NOTE — DIAGNOSTIC IMAGING REPORT
PROCEDURE: CT abdomen and pelvis with contrast.



TECHNIQUE: Multiple contiguous axial images were obtained through

the abdomen and pelvis after administration of intravenous

contrast. Auto Exposure Controls were utilized during the CT exam

to meet ALARA standards for radiation dose reduction. All CT

scans use one or more of the following dose optimizing

techniques: automated exposure control, MA and/or KvP adjustment

based on patient size and exam type or iterative reconstruction.



INDICATION:  Abdominal pain one day after hysterectomy.



FINDINGS: There is moderate to large amount of pneumoperitoneum

and additional gas seen within the mesentery. There is a small

amount of peritoneal free fluid. Partially opacified urinary

bladder is unremarkable. There is no evidence of perivesicular

contrast extravasation. There is no evidence of significant

hemorrhage at the hysterectomy site. Great vessels of the abdomen

are unremarkable. Ureters demonstrate normal course bilaterally.

There is no evidence of urine leak. No hepatic, gallbladder,

pancreatic, adrenal gland or splenic lesion is identified. The

appendix is not visualized and may be surgically absent.



IMPRESSION: Postoperative findings of recent hysterectomy with

moderate amount of pneumoperitoneum. No other definite acute

complication is identified.



Dictated by: 



  Dictated on workstation # [id number]

## 2021-11-10 NOTE — CONSULTATION
History of Present Illness


History of Present Illness


Patient Consulted On(jose/time)


11/10/21


 16:31


Date Seen by Provider:  Nov 10, 2021


Time Seen by Provider:  16:31


Reason for Visit:  Abdominal pain


History of Present Illness


Patient underwent hysterectomy yesterday with silvia for the bowel.  Discharged and

returned to the ER for not feeling well.  + Urinary retention with 800cc for 

straight cath and then later martinez insertion with 500cc.  No flatus.





Allergies and Home Medications


Allergies


Coded Allergies:  


     No Known Drug Allergies (Unverified , 1/7/19)





Patient Home Medication List


Home Medication List Reviewed:  Yes


Acetaminophen (Acetaminophen) 500 Mg Tablet, 1,000 MG PO Q8HR


   Prescribed by: BRANDO CASTILLO on 11/9/21 1523


Fexofenadine HCl (Allegra Allergy) 180 Mg Tablet, 180 MG PO DAILY, (Reported)


   Entered as Reported by: OFELIA YIN on 11/3/21 1308


Ibuprofen (Ibu) 600 Mg Tablet, 600 MG PO Q6HR


   Prescribed by: BRANDO CASTILLO on 11/9/21 1523


Multivitamin (Multivitamin) 1 Each Tablet, 1 EACH PO DAILY, (Reported)


   Entered as Reported by: OFELIA YIN on 11/3/21 1308


Oxycodone Hcl (Oxyir Tablet) 5 Mg Tab, 5 MG PO Q4HR PRN for PAIN-SEE DOSE 

INSTRUCTIONS


   Prescribed by: BRANDO CASTILLO on 11/9/21 1523


Discontinued Medications


Fluoxetine HCl (Prozac) 40 Mg Capsule, 40 MG PO DAILY, (Reported)


   Discontinued Reason: No Longer Taking


   Entered as Reported by: NAVNEET WATERMAN on 1/8/19 0714


Hydrocodone Bit/Acetaminophen (Lortab  5 Mg Tablet) 1 Tab Tab, 1 TAB PO Q6H PRN 

for PAIN-MODERATE


   Discontinued Reason: No Longer Taking


   Prescribed by: BRANDO CASTILLO on 1/8/19 1117


Ibuprofen (Ibuprofen) 600 Mg Tablet, 600 MG PO Q6H


   Discontinued Reason: No Longer Taking


   Prescribed by: BRANOD CASTILLO on 1/8/19 1117


Ranitidine HCl (Acid Reducer (RANITIDINE)) 75 Mg Tablet, 75 MG PO DAILY, 

(Reported)


   Discontinued Reason: No Longer Taking


   Entered as Reported by: RODOLFO HUANG on 1/7/19 1209





Past Medical-Social-Family Hx


Patient Social History


Tobacco Use?:  No


Smoking Status:  Never a Smoker


Use of E-Cig and/or Vaping dev:  No


Substance use?:  No


Alcohol Use?:  No


Pt feels they are or have been:  No





Immunizations Up To Date


Tetanus Booster (TDap):  Less than 5yrs


PED Vaccines UTD:  No


First/Initial COVID19 Vaccinat:  NA





Seasonal Allergies


Seasonal Allergies:  Yes





Past Medical History


Surgeries:  Yes (D&C x1, CS x2)


Appendectomy, Tubal Ligation


Respiratory:  Yes (asthma with seasonal allergies)


Asthma


Currently Using CPAP:  No


Currently Using BIPAP:  No


Cardiac:  Yes (HX SVT 2014)


Neurological:  No


Pregnant:  No


Reproductive Disorders:  No


Female Reproductive Disorders:  Denies, Menstrual Problems


Sexually Transmitted Disease:  No


HIV/AIDS:  No


Genitourinary:  No


Gastrointestinal:  No


Musculoskeletal:  Yes


Fibromyalgia


Endocrine:  No


HEENT:  Yes (GLASSES)


Loss of Vision:  Bilateral


Hearing Impairment:  Denies


Cancer:  No


Psychosocial:  Yes (pp depression with last pregnancy)


Depression


Integumentary:  No


Blood Disorders:  No


Adverse Reaction/Blood Tranf:  No (N/A)





Family Medical History





Family history: Diabetes mellitus


  19 MOTHER, Onset:40's - 50


Family history: Glaucoma


  19 MOTHER, Onset:50's - 60


Psychotic disorder


  20 MOTHER (depression)


Diabetes, Psychiatric Problems





Physical Exam-General Problems


Physical Exam


Vital Signs





Vital Signs - First Documented








 11/10/21





 04:25


 


Temp 37.1


 


Pulse 120


 


Resp 22


 


B/P (MAP) 142/76 (98)


 


Pulse Ox 100


 


O2 Delivery Room Air





Capillary Refill : Less Than 3 Seconds


Gastrointestinal:  non tender, soft





Assessment/Plan


Assessment/Plan


Admission Diagnosis/Plan


Abdominal pain











SOSA REINOSO DO        Nov 10, 2021 16:36

## 2021-11-10 NOTE — DIAGNOSTIC IMAGING REPORT
INDICATION: Abdominal pain



KUB 5:18 PM



There is a moderate amount of intraperitoneal free air. Bowel gas

pattern is normal. The lung bases are clear.



IMPRESSION: Moderate-to-large pneumoperitoneum



Patient is one day status post hysterectomy.



CRITICAL FINDING



Called to Dr. Gutiérrez at 5:30 p.m. by cvb.



Dictated by: 



  Dictated on workstation # [id number]

## 2021-11-11 VITALS — SYSTOLIC BLOOD PRESSURE: 104 MMHG | DIASTOLIC BLOOD PRESSURE: 55 MMHG

## 2021-11-11 VITALS — DIASTOLIC BLOOD PRESSURE: 54 MMHG | SYSTOLIC BLOOD PRESSURE: 99 MMHG

## 2021-11-11 VITALS — DIASTOLIC BLOOD PRESSURE: 67 MMHG | SYSTOLIC BLOOD PRESSURE: 115 MMHG

## 2021-11-11 VITALS — DIASTOLIC BLOOD PRESSURE: 54 MMHG | SYSTOLIC BLOOD PRESSURE: 98 MMHG

## 2021-11-11 VITALS — SYSTOLIC BLOOD PRESSURE: 120 MMHG | DIASTOLIC BLOOD PRESSURE: 64 MMHG

## 2021-11-11 LAB
ALBUMIN SERPL-MCNC: 3.2 GM/DL (ref 3.2–4.5)
ALP SERPL-CCNC: 34 U/L (ref 40–136)
ALT SERPL-CCNC: 11 U/L (ref 0–55)
BASOPHILS # BLD AUTO: 0 10^3/UL (ref 0–0.1)
BASOPHILS NFR BLD AUTO: 0 % (ref 0–10)
BILIRUB SERPL-MCNC: 0.5 MG/DL (ref 0.1–1)
BUN/CREAT SERPL: 18
CALCIUM SERPL-MCNC: 7.6 MG/DL (ref 8.5–10.1)
CHLORIDE SERPL-SCNC: 110 MMOL/L (ref 98–107)
CO2 SERPL-SCNC: 18 MMOL/L (ref 21–32)
CREAT SERPL-MCNC: 0.56 MG/DL (ref 0.6–1.3)
EOSINOPHIL # BLD AUTO: 0.1 10^3/UL (ref 0–0.3)
EOSINOPHIL NFR BLD AUTO: 1 % (ref 0–10)
GFR SERPLBLD BASED ON 1.73 SQ M-ARVRAT: 123 ML/MIN
GLUCOSE SERPL-MCNC: 88 MG/DL (ref 70–105)
HCT VFR BLD CALC: 27 % (ref 35–52)
HGB BLD-MCNC: 9.2 G/DL (ref 11.5–16)
LYMPHOCYTES # BLD AUTO: 1.6 10^3/UL (ref 1–4)
LYMPHOCYTES NFR BLD AUTO: 17 % (ref 12–44)
MANUAL DIFFERENTIAL PERFORMED BLD QL: NO
MCH RBC QN AUTO: 30 PG (ref 25–34)
MCHC RBC AUTO-ENTMCNC: 34 G/DL (ref 32–36)
MCV RBC AUTO: 88 FL (ref 80–99)
MONOCYTES # BLD AUTO: 0.6 10^3/UL (ref 0–1)
MONOCYTES NFR BLD AUTO: 6 % (ref 0–12)
NEUTROPHILS # BLD AUTO: 7.5 10^3/UL (ref 1.8–7.8)
NEUTROPHILS NFR BLD AUTO: 77 % (ref 42–75)
PLATELET # BLD: 162 10^3/UL (ref 130–400)
PMV BLD AUTO: 11.3 FL (ref 9–12.2)
POTASSIUM SERPL-SCNC: 3.7 MMOL/L (ref 3.6–5)
PROT SERPL-MCNC: 5 GM/DL (ref 6.4–8.2)
SODIUM SERPL-SCNC: 137 MMOL/L (ref 135–145)
WBC # BLD AUTO: 9.8 10^3/UL (ref 4.3–11)

## 2021-11-11 RX ADMIN — BETHANECHOL CHLORIDE SCH MG: 25 TABLET ORAL at 11:51

## 2021-11-11 RX ADMIN — ACETAMINOPHEN SCH MG: 500 TABLET ORAL at 05:40

## 2021-11-11 RX ADMIN — BETHANECHOL CHLORIDE SCH MG: 25 TABLET ORAL at 05:24

## 2021-11-11 RX ADMIN — METOCLOPRAMIDE SCH MG: 5 INJECTION, SOLUTION INTRAMUSCULAR; INTRAVENOUS at 00:15

## 2021-11-11 RX ADMIN — METRONIDAZOLE SCH MLS/HR: 5 INJECTION, SOLUTION INTRAVENOUS at 14:31

## 2021-11-11 RX ADMIN — SODIUM CHLORIDE, SODIUM LACTATE, POTASSIUM CHLORIDE, AND CALCIUM CHLORIDE SCH MLS/HR: 600; 310; 30; 20 INJECTION, SOLUTION INTRAVENOUS at 09:49

## 2021-11-11 RX ADMIN — METOCLOPRAMIDE SCH MG: 5 INJECTION, SOLUTION INTRAMUSCULAR; INTRAVENOUS at 11:51

## 2021-11-11 RX ADMIN — METRONIDAZOLE SCH MLS/HR: 5 INJECTION, SOLUTION INTRAVENOUS at 05:23

## 2021-11-11 RX ADMIN — KETOROLAC TROMETHAMINE SCH MG: 30 INJECTION, SOLUTION INTRAMUSCULAR; INTRAVENOUS at 03:08

## 2021-11-11 RX ADMIN — ACETAMINOPHEN SCH MG: 500 TABLET ORAL at 14:30

## 2021-11-11 RX ADMIN — METOCLOPRAMIDE SCH MG: 5 INJECTION, SOLUTION INTRAMUSCULAR; INTRAVENOUS at 05:24

## 2021-11-11 RX ADMIN — KETOROLAC TROMETHAMINE SCH MG: 30 INJECTION, SOLUTION INTRAMUSCULAR; INTRAVENOUS at 08:36

## 2021-11-11 RX ADMIN — SODIUM CHLORIDE, SODIUM LACTATE, POTASSIUM CHLORIDE, AND CALCIUM CHLORIDE SCH MLS/HR: 600; 310; 30; 20 INJECTION, SOLUTION INTRAVENOUS at 03:08

## 2021-11-11 RX ADMIN — KETOROLAC TROMETHAMINE SCH MG: 30 INJECTION, SOLUTION INTRAMUSCULAR; INTRAVENOUS at 14:30

## 2021-11-11 NOTE — PROGRESS NOTE
Subjective


Date Seen by a Provider:  Nov 11, 2021


Time Seen by a Provider:  07:50


Subjective/Events-last exam


urinary retention (850 ml cath and then 500 ml bladder scan) so martinez replaced 

and started on urecholine and flomax.  Will dc martinez this am, but UO was 

decreased overnight.


WBC is improving.  KUB yesterday showed moderate pneumoperitoneum consistent 

with previous Xray and CT and consistent with post operative status.  Xray this 

am has not yet been completed





Will give fluid bolus and then DC martinez.  If still has retention, will have Dr. Tawil consult.  





Vitals are stable and she feels better. 


Laboratory Tests








Test


 11/10/21


09:00 11/10/21


18:00 11/11/21


05:34 Range/Units


 


 


White Blood Count


 19.2 H


 15.4 H


 9.8 


 4.3-11.0


10^3/uL


 


Red Blood Count


 3.80 


 3.50 L


 3.12 L


 3.80-5.11


10^6/uL


 


Hemoglobin 11.1 L 10.1 L 9.2 L 11.5-16.0  g/dL


 


Hematocrit 33 L 30 L 27 L 35-52  %


 


Mean Corpuscular Volume 86  86  88  80-99  fL


 


Mean Corpuscular Hemoglobin 29  29  30  25-34  pg


 


Mean Corpuscular Hemoglobin


Concent 34 


 33 


 34 


 32-36  g/dL





 


Red Cell Distribution Width 12.4  12.7  13.1  10.0-14.5  %


 


Platelet Count


 196 


 177 


 162 


 130-400


10^3/uL


 


Mean Platelet Volume 9.6  9.7  11.3  9.0-12.2  fL


 


Immature Granulocyte % (Auto) 0  1  0   %


 


Neutrophils (%) (Auto) 88 H 84 H 77 H 42-75  %


 


Lymphocytes (%) (Auto) 6 L 10 L 17  12-44  %


 


Monocytes (%) (Auto) 5  5  6  0-12  %


 


Eosinophils (%) (Auto) 0  0  1  0-10  %


 


Basophils (%) (Auto) 0  0  0  0-10  %


 


Neutrophils # (Auto)


 16.9 H


 13.0 H


 7.5 


 1.8-7.8


10^3/uL


 


Lymphocytes # (Auto)


 1.1 


 1.5 


 1.6 


 1.0-4.0


10^3/uL


 


Monocytes # (Auto)


 1.0 


 0.8 


 0.6 


 0.0-1.0


10^3/uL


 


Eosinophils # (Auto)


 0.0 


 0.0 


 0.1 


 0.0-0.3


10^3/uL


 


Basophils # (Auto)


 0.0 


 0.0-0.1


10^3/uL


 


Immature Granulocyte # (Auto)


 0.1 


 0.1 


 0.0 


 0.0-0.1


10^3/uL


 


Neutrophils % (Manual)  85    %


 


Lymphocytes % (Manual)  12    %


 


Monocytes % (Manual)  2    %


 


Eosinophils % (Manual)  1    %


 


Blood Morphology Comment  NORMAL    


 


Sodium Level   137  135-145  MMOL/L


 


Potassium Level   3.7  3.6-5.0  MMOL/L


 


Chloride Level   110 H   MMOL/L


 


Carbon Dioxide Level   18 L 21-32  MMOL/L


 


Anion Gap   9  5-14  MMOL/L


 


Blood Urea Nitrogen   10  7-18  MG/DL


 


Creatinine


 


 


 0.56 L


 0.60-1.30


MG/DL


 


Estimat Glomerular Filtration


Rate 


 


 123 


  





 


BUN/Creatinine Ratio   18   


 


Glucose Level   88    MG/DL


 


Calcium Level   7.6 L 8.5-10.1  MG/DL


 


Corrected Calcium   8.2 L 8.5-10.1  MG/DL


 


Total Bilirubin   0.5  0.1-1.0  MG/DL


 


Aspartate Amino Transf


(AST/SGOT) 


 


 19 


 5-34  U/L





 


Alanine Aminotransferase


(ALT/SGPT) 


 


 11 


 0-55  U/L





 


Alkaline Phosphatase   34 L   U/L


 


Total Protein   5.0 L 6.4-8.2  GM/DL


 


Albumin   3.2  3.2-4.5  GM/DL











Focused Exam


Lactate Level


11/10/21 04:40: Lactic Acid Level 2.50*H


11/10/21 07:25: Lactic Acid Level 1.95





Objective


Exam





Vital Signs








  Date Time  Temp Pulse Resp B/P (MAP) Pulse Ox O2 Delivery O2 Flow Rate FiO2


 


11/11/21 05:36 36.7 104 16 104/55 (71) 97 Room Air  


 


11/11/21 03:09 37.4 94 16 98/54 (69) 96 Room Air  


 


11/11/21 00:15 36.6 106 16 99/54 (69) 98 Room Air  


 


11/10/21 19:50 37.7 119 16 102/48 (66) 96 Room Air  


 


11/10/21 16:22 36.8 95 16 104/57 (73) 100 Room Air  


 


11/10/21 12:19 37.6 96 16 101/55 (70) 100 Room Air  


 


11/10/21 11:33      Room Air  


 


11/10/21 08:00 36.7 93 18 120/58 (78) 98 Room Air  


 


11/10/21 07:55  86 16 108/59 100 Room Air  














I & O 


 


 11/11/21





 07:00


 


Intake Total 500 ml


 


Output Total 2675 ml


 


Balance -2175 ml





Capillary Refill : Less Than 3 Seconds


General Appearance:  No Apparent Distress


Respiratory:  Lungs Clear, Normal Breath Sounds


Cardiovascular:  Regular Rate, Rhythm


Gastrointestinal:  normal bowel sounds





Results


Lab


Laboratory Tests


11/10/21 09:00: 


White Blood Count 19.2H, Red Blood Count 3.80, Hemoglobin 11.1L, Hematocrit 33L,

Mean Corpuscular Volume 86, Mean Corpuscular Hemoglobin 29, Mean Corpuscular 

Hemoglobin Concent 34, Red Cell Distribution Width 12.4, Platelet Count 196, 

Mean Platelet Volume 9.6, Immature Granulocyte % (Auto) 0, Neutrophils (%) 

(Auto) 88H, Lymphocytes (%) (Auto) 6L, Monocytes (%) (Auto) 5, Eosinophils (%) 

(Auto) 0, Basophils (%) (Auto) 0, Neutrophils # (Auto) 16.9H, Lymphocytes # 

(Auto) 1.1, Monocytes # (Auto) 1.0, Eosinophils # (Auto) 0.0, Basophils # (Auto)

0.0, Immature Granulocyte # (Auto) 0.1


11/10/21 18:00: 


White Blood Count 15.4H, Red Blood Count 3.50L, Hemoglobin 10.1L, Hematocrit 30L

, Mean Corpuscular Volume 86, Mean Corpuscular Hemoglobin 29, Mean Corpuscular 

Hemoglobin Concent 33, Red Cell Distribution Width 12.7, Platelet Count 177, 

Mean Platelet Volume 9.7, Immature Granulocyte % (Auto) 1, Neutrophils (%) 

(Auto) 84H, Lymphocytes (%) (Auto) 10L, Monocytes (%) (Auto) 5, Eosinophils (%) 

(Auto) 0, Basophils (%) (Auto) 0, Neutrophils # (Auto) 13.0H, Lymphocytes # 

(Auto) 1.5, Monocytes # (Auto) 0.8, Eosinophils # (Auto) 0.0, Basophils # (Auto)

0.0, Immature Granulocyte # (Auto) 0.1, Neutrophils % (Manual) 85, Lymphocytes %

(Manual) 12, Monocytes % (Manual) 2, Eosinophils % (Manual) 1, Blood Morphology 

Comment NORMAL


11/11/21 05:34: 


White Blood Count 9.8, Red Blood Count 3.12L, Hemoglobin 9.2L, Hematocrit 27L, 

Mean Corpuscular Volume 88, Mean Corpuscular Hemoglobin 30, Mean Corpuscular 

Hemoglobin Concent 34, Red Cell Distribution Width 13.1, Platelet Count 162, 

Mean Platelet Volume 11.3, Immature Granulocyte % (Auto) 0, Neutrophils (%) 

(Auto) 77H, Lymphocytes (%) (Auto) 17, Monocytes (%) (Auto) 6, Eosinophils (%) 

(Auto) 1, Basophils (%) (Auto) 0, Neutrophils # (Auto) 7.5, Lymphocytes # (Auto)

1.6, Monocytes # (Auto) 0.6, Eosinophils # (Auto) 0.1, Basophils # (Auto) 0.0, 

Immature Granulocyte # (Auto) 0.0, Sodium Level 137, Potassium Level 3.7, 

Chloride Level 110H, Carbon Dioxide Level 18L, Anion Gap 9, Blood Urea Nitrogen 

10, Creatinine 0.56L, Estimat Glomerular Filtration Rate 123, BUN/Creatinine 

Ratio 18, Glucose Level 88, Calcium Level 7.6L, Corrected Calcium 8.2L, Total 

Bilirubin 0.5, Aspartate Amino Transf (AST/SGOT) 19, Alanine Aminotransferase 

(ALT/SGPT) 11, Alkaline Phosphatase 34L, Total Protein 5.0L, Albumin 3.2











BRANDO CASTILLO DO               Nov 11, 2021 07:59

## 2021-11-11 NOTE — HISTORY & PHYSICAL
History of Present Illness


History of Present Illness


Reason for visit/HPI


post operative abdominal pain


Date of Admission


Nov 10, 2021 at 06:00


Date Seen by a Provider:  Nov 10, 2021


Time Seen by a Provider:  08:30


I consulted on this patient on





Attending Physician


Brando Castillo DO


Admitting Physician


Dae Oshea MD


Consult








Allergies and Home Medications


Allergies


Coded Allergies:  


     No Known Drug Allergies (Unverified , 1/7/19)





Patient Home Medication List


Acetaminophen (Acetaminophen) 500 Mg Tablet, 1,000 MG PO Q8HR


   Prescribed by: BRANDO CASTILLO on 11/9/21 1523


Fexofenadine HCl (Allegra Allergy) 180 Mg Tablet, 180 MG PO DAILY, (Reported)


   Entered as Reported by: OFELIA YIN on 11/3/21 1308


Ibuprofen (Ibu) 600 Mg Tablet, 600 MG PO Q6HR


   Prescribed by: BRANDO CASTILLO on 11/9/21 1523


Multivitamin (Multivitamin) 1 Each Tablet, 1 EACH PO DAILY, (Reported)


   Entered as Reported by: OFELIA YIN on 11/3/21 1308


Oxycodone Hcl (Oxyir Tablet) 5 Mg Tab, 5 MG PO Q4HR PRN for PAIN-SEE DOSE 

INSTRUCTIONS


   Prescribed by: BRANDO CASTILLO on 11/9/21 1523





Past Medical-Social-Family Hx


Patient Social History


Tobacco Use?:  No


Smoking Status:  Never a Smoker


Use of E-Cig and/or Vaping dev:  No


Substance use?:  No


Alcohol Use?:  No


Pt feels they are or have been:  No





Immunizations Up To Date


Date of Influenza Vaccine:  Oct 8, 2018


First/Initial COVID19 Vaccinat:  NA


Hepatitis A:  Yes


Hepatitis B:  Yes


PED Vaccines UTD:  No





Seasonal Allergies


Seasonal Allergies:  Yes





Current Status


Pregnancy status:  No


Primary Language:  English


Preferred Spoken Language:  English





Past Medical History


Surgeries:  Appendectomy, Tubal Ligation


Asthma


Currently Using CPAP:  No


Currently Using BIPAP:  No


Sexually Transmitted Disease:  No


HIV/AIDS:  No


Fibromyalgia


Loss of Vision:  Bilateral


Hearing Impairment:  Denies


Depression


Blood Disorders:  No


Adverse Reaction/Blood Tranf:  No (N/A)





Family Medical History





Family history: Diabetes mellitus


  19 MOTHER, Onset:40's - 50


Family history: Glaucoma


  19 MOTHER, Onset:50's - 60


Psychotic disorder


  19 MOTHER (depression)


Diabetes, Psychiatric Problems





Physical Exam


Vital Signs





Vital Signs - First Documented








 11/10/21





 04:25


 


Temp 37.1


 


Pulse 120


 


Resp 22


 


B/P (MAP) 142/76 (98)


 


Pulse Ox 100


 


O2 Delivery Room Air





Capillary Refill : Less Than 3 Seconds


Height, Weight, BMI


Height: 5'1.00"


Weight: 118lbs. 0.0oz. [id number]; 19.00 BMI


Method:Stated











BRANDO CASTILLO DO               Nov 11, 2021 07:54

## 2021-11-11 NOTE — DIAGNOSTIC IMAGING REPORT
EXAMINATION: Abdomen 2 view



HISTORY: Postoperative abdominal pain



COMPARISON: 11/10/2021



FINDINGS: 



There is a moderate amount of gas and stool throughout the colon.

Nonobstructive bowel gas pattern. Stable linear calcification or

metallic artifacts in the left mid abdomen compared to

11/10/2021. There is significant amount of air seen under the

diaphragm bilaterally which may be postoperative. The lung bases

are clear. The osseous structures are intact.



IMPRESSION:



Intra-abdominal free air which may be postoperative.

No other acute radiographic abnormality in the abdomen.



Dictated by: 



  Dictated on workstation # [id number]

## 2021-11-11 NOTE — PROGRESS NOTE
Standard Progress Note


Progress Notes/Assess & Plan


Date Seen by a Provider:  Nov 11, 2021


Time Seen by a Provider:  15:27


Progress/Assessment & Plan


Patient seen and examined.  Doing well.  + flatus, + urine


vss


abd:  Soft, non tender, dressing clean and dry


a/p s/p Robotic Hysterectomy


continue current management


Patient surgically stable for discharge





Focused Exam


Lactate Level


11/10/21 04:40: Lactic Acid Level 2.50*H


11/10/21 07:25: Lactic Acid Level 1.95














SOSA REINOSO DO        Nov 11, 2021 15:29

## 2024-08-19 NOTE — ANESTHESIA-GENERAL POST-OP
09/06/24      Clifton Garcia  [address] [facility] [address]       My office has attempted to reach you by phone to relay results and recommendations. We have not received a return call. Please call my office to obtain these results.     Sincerely,     Qi Harris MD  [facility], [address]  [address] [address]  [address]  [facility] [address]  Phone: [phone]  Fax: [phone]                 General


Patient Condition


Mental Status/LOC:  Same as Preop


Cardiovascular:  Satisfactory


Nausea/Vomiting:  Absent


Respiratory:  Satisfactory


Pain:  Controlled


Complications:  Absent





Post Op Complications


Complications


None





Follow Up Care/Instructions


Patient Instructions


None needed.





Anesthesia/Patient Condition


Patient Condition


Patient is doing well, no complaints, stable vital signs, no apparent adverse 

anesthesia problems.   


No complications reported per nursing.











ANJU MCDONALD CRNA Jan 8, 2019 13:23